# Patient Record
Sex: FEMALE | Race: WHITE | NOT HISPANIC OR LATINO | Employment: UNEMPLOYED | URBAN - METROPOLITAN AREA
[De-identification: names, ages, dates, MRNs, and addresses within clinical notes are randomized per-mention and may not be internally consistent; named-entity substitution may affect disease eponyms.]

---

## 2024-02-17 ENCOUNTER — APPOINTMENT (OUTPATIENT)
Dept: RADIOLOGY | Facility: HOSPITAL | Age: 39
End: 2024-02-17
Payer: COMMERCIAL

## 2024-02-17 ENCOUNTER — HOSPITAL ENCOUNTER (INPATIENT)
Facility: HOSPITAL | Age: 39
LOS: 2 days | Discharge: HOME | End: 2024-02-20
Attending: STUDENT IN AN ORGANIZED HEALTH CARE EDUCATION/TRAINING PROGRAM | Admitting: HOSPITALIST
Payer: COMMERCIAL

## 2024-02-17 ENCOUNTER — APPOINTMENT (OUTPATIENT)
Dept: CARDIOLOGY | Facility: HOSPITAL | Age: 39
End: 2024-02-17
Payer: COMMERCIAL

## 2024-02-17 DIAGNOSIS — J10.1 INFLUENZA B: Primary | ICD-10-CM

## 2024-02-17 DIAGNOSIS — R09.02 HYPOXIA: ICD-10-CM

## 2024-02-17 LAB
ALBUMIN SERPL BCP-MCNC: 3.9 G/DL (ref 3.4–5)
ALP SERPL-CCNC: 54 U/L (ref 33–110)
ALT SERPL W P-5'-P-CCNC: 25 U/L (ref 7–45)
ANION GAP SERPL CALC-SCNC: 15 MMOL/L (ref 10–20)
AST SERPL W P-5'-P-CCNC: 27 U/L (ref 9–39)
B-HCG SERPL-ACNC: <2 MIU/ML
BASOPHILS # BLD MANUAL: 0 X10*3/UL (ref 0–0.1)
BASOPHILS NFR BLD MANUAL: 0 %
BILIRUB SERPL-MCNC: 0.3 MG/DL (ref 0–1.2)
BUN SERPL-MCNC: 5 MG/DL (ref 6–23)
CALCIUM SERPL-MCNC: 8.4 MG/DL (ref 8.6–10.3)
CHLORIDE SERPL-SCNC: 97 MMOL/L (ref 98–107)
CO2 SERPL-SCNC: 26 MMOL/L (ref 21–32)
CREAT SERPL-MCNC: 0.57 MG/DL (ref 0.5–1.05)
EGFRCR SERPLBLD CKD-EPI 2021: >90 ML/MIN/1.73M*2
EOSINOPHIL # BLD MANUAL: 0.07 X10*3/UL (ref 0–0.7)
EOSINOPHIL NFR BLD MANUAL: 1 %
ERYTHROCYTE [DISTWIDTH] IN BLOOD BY AUTOMATED COUNT: 12.6 % (ref 11.5–14.5)
FLUAV RNA RESP QL NAA+PROBE: NOT DETECTED
FLUBV RNA RESP QL NAA+PROBE: DETECTED
GLUCOSE SERPL-MCNC: 96 MG/DL (ref 74–99)
HCT VFR BLD AUTO: 40 % (ref 36–46)
HGB BLD-MCNC: 13.6 G/DL (ref 12–16)
IMM GRANULOCYTES # BLD AUTO: 0.03 X10*3/UL (ref 0–0.7)
IMM GRANULOCYTES NFR BLD AUTO: 0.4 % (ref 0–0.9)
LYMPHOCYTES # BLD MANUAL: 1.05 X10*3/UL (ref 1.2–4.8)
LYMPHOCYTES NFR BLD MANUAL: 15 %
MAGNESIUM SERPL-MCNC: 2 MG/DL (ref 1.6–2.4)
MCH RBC QN AUTO: 30.4 PG (ref 26–34)
MCHC RBC AUTO-ENTMCNC: 34 G/DL (ref 32–36)
MCV RBC AUTO: 89 FL (ref 80–100)
METAMYELOCYTES # BLD MANUAL: 0.14 X10*3/UL
METAMYELOCYTES NFR BLD MANUAL: 2 %
MONOCYTES # BLD MANUAL: 0.28 X10*3/UL (ref 0.1–1)
MONOCYTES NFR BLD MANUAL: 4 %
NEUTROPHILS # BLD MANUAL: 5.18 X10*3/UL (ref 1.2–7.7)
NEUTS BAND # BLD MANUAL: 0.14 X10*3/UL (ref 0–0.7)
NEUTS BAND NFR BLD MANUAL: 2 %
NEUTS SEG # BLD MANUAL: 5.04 X10*3/UL (ref 1.2–7)
NEUTS SEG NFR BLD MANUAL: 72 %
NRBC BLD-RTO: 0 /100 WBCS (ref 0–0)
PHOSPHATE SERPL-MCNC: 3.2 MG/DL (ref 2.5–4.9)
PLATELET # BLD AUTO: 221 X10*3/UL (ref 150–450)
POTASSIUM SERPL-SCNC: 4 MMOL/L (ref 3.5–5.3)
PROT SERPL-MCNC: 7.5 G/DL (ref 6.4–8.2)
RBC # BLD AUTO: 4.48 X10*6/UL (ref 4–5.2)
RBC MORPH BLD: ABNORMAL
SARS-COV-2 RNA RESP QL NAA+PROBE: NOT DETECTED
SODIUM SERPL-SCNC: 134 MMOL/L (ref 136–145)
TOTAL CELLS COUNTED BLD: 100
VARIANT LYMPHS # BLD MANUAL: 0.28 X10*3/UL (ref 0–0.5)
VARIANT LYMPHS NFR BLD: 4 %
WBC # BLD AUTO: 7 X10*3/UL (ref 4.4–11.3)

## 2024-02-17 PROCEDURE — 71275 CT ANGIOGRAPHY CHEST: CPT | Mod: FOREIGN READ | Performed by: RADIOLOGY

## 2024-02-17 PROCEDURE — 87636 SARSCOV2 & INF A&B AMP PRB: CPT

## 2024-02-17 PROCEDURE — 36415 COLL VENOUS BLD VENIPUNCTURE: CPT | Performed by: STUDENT IN AN ORGANIZED HEALTH CARE EDUCATION/TRAINING PROGRAM

## 2024-02-17 PROCEDURE — 83735 ASSAY OF MAGNESIUM: CPT

## 2024-02-17 PROCEDURE — 94640 AIRWAY INHALATION TREATMENT: CPT

## 2024-02-17 PROCEDURE — 36415 COLL VENOUS BLD VENIPUNCTURE: CPT

## 2024-02-17 PROCEDURE — 80053 COMPREHEN METABOLIC PANEL: CPT

## 2024-02-17 PROCEDURE — 93005 ELECTROCARDIOGRAM TRACING: CPT

## 2024-02-17 PROCEDURE — 2500000004 HC RX 250 GENERAL PHARMACY W/ HCPCS (ALT 636 FOR OP/ED): Performed by: STUDENT IN AN ORGANIZED HEALTH CARE EDUCATION/TRAINING PROGRAM

## 2024-02-17 PROCEDURE — 85027 COMPLETE CBC AUTOMATED: CPT

## 2024-02-17 PROCEDURE — 2550000001 HC RX 255 CONTRASTS

## 2024-02-17 PROCEDURE — 71046 X-RAY EXAM CHEST 2 VIEWS: CPT

## 2024-02-17 PROCEDURE — 2500000002 HC RX 250 W HCPCS SELF ADMINISTERED DRUGS (ALT 637 FOR MEDICARE OP, ALT 636 FOR OP/ED)

## 2024-02-17 PROCEDURE — 96375 TX/PRO/DX INJ NEW DRUG ADDON: CPT

## 2024-02-17 PROCEDURE — 85060 BLOOD SMEAR INTERPRETATION: CPT

## 2024-02-17 PROCEDURE — 2500000004 HC RX 250 GENERAL PHARMACY W/ HCPCS (ALT 636 FOR OP/ED)

## 2024-02-17 PROCEDURE — 71046 X-RAY EXAM CHEST 2 VIEWS: CPT | Performed by: RADIOLOGY

## 2024-02-17 PROCEDURE — 84702 CHORIONIC GONADOTROPIN TEST: CPT | Performed by: STUDENT IN AN ORGANIZED HEALTH CARE EDUCATION/TRAINING PROGRAM

## 2024-02-17 PROCEDURE — 96367 TX/PROPH/DG ADDL SEQ IV INF: CPT

## 2024-02-17 PROCEDURE — 99285 EMERGENCY DEPT VISIT HI MDM: CPT | Mod: 25

## 2024-02-17 PROCEDURE — 71275 CT ANGIOGRAPHY CHEST: CPT

## 2024-02-17 PROCEDURE — 84100 ASSAY OF PHOSPHORUS: CPT

## 2024-02-17 PROCEDURE — 96365 THER/PROPH/DIAG IV INF INIT: CPT

## 2024-02-17 PROCEDURE — 85007 BL SMEAR W/DIFF WBC COUNT: CPT

## 2024-02-17 PROCEDURE — 87040 BLOOD CULTURE FOR BACTERIA: CPT | Mod: AHULAB | Performed by: STUDENT IN AN ORGANIZED HEALTH CARE EDUCATION/TRAINING PROGRAM

## 2024-02-17 RX ORDER — ONDANSETRON HYDROCHLORIDE 2 MG/ML
4 INJECTION, SOLUTION INTRAVENOUS ONCE
Status: COMPLETED | OUTPATIENT
Start: 2024-02-17 | End: 2024-02-17

## 2024-02-17 RX ORDER — IPRATROPIUM BROMIDE AND ALBUTEROL SULFATE 2.5; .5 MG/3ML; MG/3ML
3 SOLUTION RESPIRATORY (INHALATION) EVERY 20 MIN
Status: COMPLETED | OUTPATIENT
Start: 2024-02-17 | End: 2024-02-17

## 2024-02-17 RX ORDER — OSELTAMIVIR PHOSPHATE 75 MG/1
75 CAPSULE ORAL ONCE
Status: COMPLETED | OUTPATIENT
Start: 2024-02-17 | End: 2024-02-17

## 2024-02-17 RX ORDER — ACETAMINOPHEN 325 MG/1
975 TABLET ORAL ONCE
Status: COMPLETED | OUTPATIENT
Start: 2024-02-17 | End: 2024-02-17

## 2024-02-17 RX ADMIN — SODIUM CHLORIDE 1000 ML: 9 INJECTION, SOLUTION INTRAVENOUS at 20:14

## 2024-02-17 RX ADMIN — IPRATROPIUM BROMIDE AND ALBUTEROL SULFATE 3 ML: 2.5; .5 SOLUTION RESPIRATORY (INHALATION) at 17:23

## 2024-02-17 RX ADMIN — OSELTAMAVIR PHOSPHATE 75 MG: 75 CAPSULE ORAL at 20:16

## 2024-02-17 RX ADMIN — ONDANSETRON 4 MG: 2 INJECTION INTRAMUSCULAR; INTRAVENOUS at 22:03

## 2024-02-17 RX ADMIN — AZITHROMYCIN MONOHYDRATE 500 MG: 500 INJECTION, POWDER, LYOPHILIZED, FOR SOLUTION INTRAVENOUS at 20:50

## 2024-02-17 RX ADMIN — IPRATROPIUM BROMIDE AND ALBUTEROL SULFATE 3 ML: 2.5; .5 SOLUTION RESPIRATORY (INHALATION) at 17:25

## 2024-02-17 RX ADMIN — CEFTRIAXONE 2 G: 2 INJECTION, POWDER, FOR SOLUTION INTRAMUSCULAR; INTRAVENOUS at 20:14

## 2024-02-17 RX ADMIN — IOHEXOL 60 ML: 350 INJECTION, SOLUTION INTRAVENOUS at 21:47

## 2024-02-17 RX ADMIN — ACETAMINOPHEN 975 MG: 325 TABLET ORAL at 20:35

## 2024-02-17 RX ADMIN — IPRATROPIUM BROMIDE AND ALBUTEROL SULFATE 3 ML: 2.5; .5 SOLUTION RESPIRATORY (INHALATION) at 17:19

## 2024-02-17 ASSESSMENT — COLUMBIA-SUICIDE SEVERITY RATING SCALE - C-SSRS
2. HAVE YOU ACTUALLY HAD ANY THOUGHTS OF KILLING YOURSELF?: NO
6. HAVE YOU EVER DONE ANYTHING, STARTED TO DO ANYTHING, OR PREPARED TO DO ANYTHING TO END YOUR LIFE?: NO
1. IN THE PAST MONTH, HAVE YOU WISHED YOU WERE DEAD OR WISHED YOU COULD GO TO SLEEP AND NOT WAKE UP?: NO

## 2024-02-17 ASSESSMENT — PAIN SCALES - GENERAL
PAINLEVEL_OUTOF10: 0 - NO PAIN
PAINLEVEL_OUTOF10: 1
PAINLEVEL_OUTOF10: 0 - NO PAIN
PAINLEVEL_OUTOF10: 0 - NO PAIN

## 2024-02-17 ASSESSMENT — PAIN DESCRIPTION - DESCRIPTORS: DESCRIPTORS: HEADACHE

## 2024-02-17 ASSESSMENT — PAIN DESCRIPTION - LOCATION: LOCATION: HEAD

## 2024-02-17 ASSESSMENT — PAIN - FUNCTIONAL ASSESSMENT
PAIN_FUNCTIONAL_ASSESSMENT: 0-10

## 2024-02-17 ASSESSMENT — PAIN DESCRIPTION - PAIN TYPE: TYPE: ACUTE PAIN

## 2024-02-17 NOTE — ED TRIAGE NOTES
Pt presents to ED with c/o   was at urgent care today referred to ED for low oxygen saturation & pneumonia. Was traveling in [facility] in [place], returned  2/15    HX asthma

## 2024-02-17 NOTE — ED PROVIDER NOTES
HPI   Chief Complaint   Patient presents with    Shortness of Breath       Patient is a 38-year-old female who presents for evaluation of acute hypoxia cough and difficulty breathing.  She just got back from a hiking trip in [place] on Thursday and has been feeling progressively more short of breath over the last few days.  She endorses history of asthma and taking her home inhaler and home nebulizer treatments.  last nebulizer at 3 PM today.  She has been having congestion and a nonproductive cough with wheezing.  Nebulizers did help with her symptoms.  She has not had any fevers.  She went to urgent care for initial evaluation and with hypoxia she was sent to emergency department with concerns for pneumonia.  She is not having any leg swelling or pain.  No history of blood clots no history of OCP use no tobacco use.  She does have recent travel history with flight.                        [place] Coma Scale Score: 15                  Patient History   Past Medical History:   Diagnosis Date    Personal history of other diseases of the respiratory system     History of asthma     Past Surgical History:   Procedure Laterality Date    OTHER SURGICAL HISTORY  10/19/2021    Oral surgery     No family history on file.  Social History     Tobacco Use    Smoking status: Former     Types: Cigarettes    Smokeless tobacco: Never   Substance Use Topics    Alcohol use: Not on file    Drug use: Not on file       Physical Exam   ED Triage Vitals [02/17/24 1618]   Temperature Heart Rate Respirations BP   36.7 °C (98.1 °F) (!) 111 (!) 22 139/76      SpO2 Temp src Heart Rate Source Patient Position   -- -- -- --      BP Location FiO2 (%)     -- --       Physical Exam  Vitals and nursing note reviewed.   Constitutional:       General: She is not in acute distress.     Appearance: She is well-developed and normal weight. She is not diaphoretic.   HENT:      Head: Normocephalic and atraumatic.      Mouth/Throat:      Mouth: Mucous  membranes are moist.   Eyes:      Conjunctiva/sclera: Conjunctivae normal.   Cardiovascular:      Rate and Rhythm: Normal rate and regular rhythm.      Heart sounds: No murmur heard.  Pulmonary:      Effort: Pulmonary effort is normal. No respiratory distress.      Breath sounds: Normal breath sounds.   Abdominal:      Palpations: Abdomen is soft.      Tenderness: There is no abdominal tenderness.   Musculoskeletal:         General: No swelling.      Cervical back: Neck supple.   Skin:     General: Skin is warm and dry.      Capillary Refill: Capillary refill takes less than 2 seconds.   Neurological:      Mental Status: She is alert.   Psychiatric:         Mood and Affect: Mood normal.       ED Course & MDM   ED Course as of 02/20/24 0708   Sat Feb 17, 2024   1717 XR chest 2 views  Chest x-ray independently reviewed with no acute cardiopulmonary process concerning for consolidation/pneumonia. [SC]   2349 Bands %, Manual: 2.0 [MM]   Sun Feb 18, 2024   0008 Patient signed out to me at 1800.  Patient presenting with hypoxia, shortness of breath with new oxygen requirement of 3 L.  Found to have bilateral lower lobe pneumonia treated with ceftriaxone and azithromycin.  Patient is flu be positive, received Tamiflu.  Accepted for admission to medicine for IV antibiotics, telemetry monitoring observation. [EG]      ED Course User Index  [EG] Aracely Tracy MD  [MM] Malcolm Ceballos MD  [SC] Nelli Moore DO         Diagnoses as of 02/20/24 0708   Influenza B   Hypoxia     Labs Reviewed   HCG, URINE, QUALITATIVE   CBC WITH AUTO DIFFERENTIAL   MAGNESIUM   PHOSPHORUS   COMPREHENSIVE METABOLIC PANEL   INFLUENZA A AND B PCR   SARS-COV-2 PCR   BLOOD GAS VENOUS FULL PANEL   HUMAN CHORIONIC GONADOTROPIN, SERUM QUANTITATIVE       XR chest 2 views   Final Result   Left basilar airspace consolidation, as above. Clinical correlation   and continued follow-up until clearing is recommended.        MACRO:   None.        Signed by: Daen  Annemarie 2/17/2024 5:25 PM   Dictation workstation:   [id number]      Point of Care Ultrasound    (Results Pending)   CT angio chest for pulmonary embolism    (Results Pending)       Medical Decision Making  Patient is an otherwise healthy 38-year-old female who presents for evaluation after being found to be hypoxic at urgent care.  She just got back from a cross-country flight where she was taking [place].  She has had a cough congestion pleuritic chest pain.  My differential for this patient include viral illness pneumonia and PE with low suspicion for atypical ACS MSK or pleurisy.  On arrival patient is afebrile though hypoxic and placed on 3 L nasal cannula.  She is tachycardic and afebrile.  Blood pressures are stable.  Chest x-ray shows left-sided consolidation concerning for pneumonia and patient's viral swabs are positive for flu B.  She was started on Tamiflu and with her history of asthma was given DuoNebs.  basic labs were drawn for this patient with mild hyponatremia most likely in the setting of dehydration.  Patient was given fluids.  Otherwise labs within normal limits.  Given new hypoxia patient to receive CT PE with plans for admission after rule out of concomitant VTE.    Pt seen and discussed with Dr. Tucker Moore DO  PGY-2 Emergency Medicine          Procedure  Procedures       Nelli Moore DO  Resident  02/20/24 1174

## 2024-02-18 PROBLEM — J10.1 INFLUENZA B: Status: ACTIVE | Noted: 2024-02-18

## 2024-02-18 LAB
ANION GAP SERPL CALC-SCNC: 13 MMOL/L (ref 10–20)
BUN SERPL-MCNC: 3 MG/DL (ref 6–23)
CALCIUM SERPL-MCNC: 7.8 MG/DL (ref 8.6–10.3)
CHLORIDE SERPL-SCNC: 103 MMOL/L (ref 98–107)
CO2 SERPL-SCNC: 23 MMOL/L (ref 21–32)
CREAT SERPL-MCNC: 0.57 MG/DL (ref 0.5–1.05)
EGFRCR SERPLBLD CKD-EPI 2021: >90 ML/MIN/1.73M*2
ERYTHROCYTE [DISTWIDTH] IN BLOOD BY AUTOMATED COUNT: 12.6 % (ref 11.5–14.5)
GLUCOSE SERPL-MCNC: 102 MG/DL (ref 74–99)
HCT VFR BLD AUTO: 33.5 % (ref 36–46)
HGB BLD-MCNC: 11 G/DL (ref 12–16)
MCH RBC QN AUTO: 30.1 PG (ref 26–34)
MCHC RBC AUTO-ENTMCNC: 32.8 G/DL (ref 32–36)
MCV RBC AUTO: 92 FL (ref 80–100)
NRBC BLD-RTO: 0 /100 WBCS (ref 0–0)
PLATELET # BLD AUTO: 199 X10*3/UL (ref 150–450)
POTASSIUM SERPL-SCNC: 3.3 MMOL/L (ref 3.5–5.3)
RBC # BLD AUTO: 3.65 X10*6/UL (ref 4–5.2)
SODIUM SERPL-SCNC: 136 MMOL/L (ref 136–145)
WBC # BLD AUTO: 9.3 X10*3/UL (ref 4.4–11.3)

## 2024-02-18 PROCEDURE — 99222 1ST HOSP IP/OBS MODERATE 55: CPT | Performed by: HOSPITALIST

## 2024-02-18 PROCEDURE — 1100000001 HC PRIVATE ROOM DAILY

## 2024-02-18 PROCEDURE — 36415 COLL VENOUS BLD VENIPUNCTURE: CPT | Performed by: HOSPITALIST

## 2024-02-18 PROCEDURE — 2500000004 HC RX 250 GENERAL PHARMACY W/ HCPCS (ALT 636 FOR OP/ED): Performed by: HOSPITALIST

## 2024-02-18 PROCEDURE — 2500000002 HC RX 250 W HCPCS SELF ADMINISTERED DRUGS (ALT 637 FOR MEDICARE OP, ALT 636 FOR OP/ED): Performed by: PHARMACIST

## 2024-02-18 PROCEDURE — 2500000001 HC RX 250 WO HCPCS SELF ADMINISTERED DRUGS (ALT 637 FOR MEDICARE OP): Performed by: STUDENT IN AN ORGANIZED HEALTH CARE EDUCATION/TRAINING PROGRAM

## 2024-02-18 PROCEDURE — 87899 AGENT NOS ASSAY W/OPTIC: CPT | Mod: AHULAB | Performed by: STUDENT IN AN ORGANIZED HEALTH CARE EDUCATION/TRAINING PROGRAM

## 2024-02-18 PROCEDURE — 80048 BASIC METABOLIC PNL TOTAL CA: CPT | Performed by: HOSPITALIST

## 2024-02-18 PROCEDURE — 94640 AIRWAY INHALATION TREATMENT: CPT

## 2024-02-18 PROCEDURE — 85027 COMPLETE CBC AUTOMATED: CPT | Performed by: HOSPITALIST

## 2024-02-18 PROCEDURE — 2500000004 HC RX 250 GENERAL PHARMACY W/ HCPCS (ALT 636 FOR OP/ED): Performed by: STUDENT IN AN ORGANIZED HEALTH CARE EDUCATION/TRAINING PROGRAM

## 2024-02-18 PROCEDURE — 2500000005 HC RX 250 GENERAL PHARMACY W/O HCPCS: Performed by: HOSPITALIST

## 2024-02-18 PROCEDURE — 2500000002 HC RX 250 W HCPCS SELF ADMINISTERED DRUGS (ALT 637 FOR MEDICARE OP, ALT 636 FOR OP/ED): Performed by: HOSPITALIST

## 2024-02-18 PROCEDURE — 87449 NOS EACH ORGANISM AG IA: CPT | Mod: AHULAB | Performed by: STUDENT IN AN ORGANIZED HEALTH CARE EDUCATION/TRAINING PROGRAM

## 2024-02-18 PROCEDURE — 2500000001 HC RX 250 WO HCPCS SELF ADMINISTERED DRUGS (ALT 637 FOR MEDICARE OP): Performed by: HOSPITALIST

## 2024-02-18 RX ORDER — ACETAMINOPHEN 325 MG/1
650 TABLET ORAL EVERY 4 HOURS PRN
Status: DISCONTINUED | OUTPATIENT
Start: 2024-02-18 | End: 2024-02-20 | Stop reason: HOSPADM

## 2024-02-18 RX ORDER — FLUTICASONE FUROATE AND VILANTEROL 200; 25 UG/1; UG/1
1 POWDER RESPIRATORY (INHALATION)
Status: DISCONTINUED | OUTPATIENT
Start: 2024-02-18 | End: 2024-02-18

## 2024-02-18 RX ORDER — TALC
3 POWDER (GRAM) TOPICAL DAILY
Status: DISCONTINUED | OUTPATIENT
Start: 2024-02-18 | End: 2024-02-20 | Stop reason: HOSPADM

## 2024-02-18 RX ORDER — CEFTRIAXONE 1 G/50ML
1 INJECTION, SOLUTION INTRAVENOUS EVERY 24 HOURS
Status: DISCONTINUED | OUTPATIENT
Start: 2024-02-18 | End: 2024-02-18

## 2024-02-18 RX ORDER — IPRATROPIUM BROMIDE AND ALBUTEROL SULFATE 2.5; .5 MG/3ML; MG/3ML
3 SOLUTION RESPIRATORY (INHALATION) EVERY 2 HOUR PRN
Status: DISCONTINUED | OUTPATIENT
Start: 2024-02-18 | End: 2024-02-20 | Stop reason: HOSPADM

## 2024-02-18 RX ORDER — SODIUM CHLORIDE 9 MG/ML
100 INJECTION, SOLUTION INTRAVENOUS CONTINUOUS
Status: DISCONTINUED | OUTPATIENT
Start: 2024-02-18 | End: 2024-02-19

## 2024-02-18 RX ORDER — GUAIFENESIN 600 MG/1
600 TABLET, EXTENDED RELEASE ORAL 2 TIMES DAILY PRN
Status: DISCONTINUED | OUTPATIENT
Start: 2024-02-18 | End: 2024-02-20 | Stop reason: HOSPADM

## 2024-02-18 RX ORDER — IPRATROPIUM BROMIDE AND ALBUTEROL SULFATE 2.5; .5 MG/3ML; MG/3ML
3 SOLUTION RESPIRATORY (INHALATION)
Status: DISCONTINUED | OUTPATIENT
Start: 2024-02-18 | End: 2024-02-20 | Stop reason: HOSPADM

## 2024-02-18 RX ORDER — OSELTAMIVIR PHOSPHATE 75 MG/1
75 CAPSULE ORAL 2 TIMES DAILY
Status: DISCONTINUED | OUTPATIENT
Start: 2024-02-18 | End: 2024-02-20 | Stop reason: HOSPADM

## 2024-02-18 RX ORDER — FORMOTEROL FUMARATE DIHYDRATE 20 UG/2ML
20 SOLUTION RESPIRATORY (INHALATION)
Status: DISCONTINUED | OUTPATIENT
Start: 2024-02-18 | End: 2024-02-20 | Stop reason: HOSPADM

## 2024-02-18 RX ORDER — BUDESONIDE 0.5 MG/2ML
0.5 INHALANT ORAL
Status: DISCONTINUED | OUTPATIENT
Start: 2024-02-18 | End: 2024-02-20 | Stop reason: HOSPADM

## 2024-02-18 RX ORDER — IPRATROPIUM BROMIDE AND ALBUTEROL SULFATE 2.5; .5 MG/3ML; MG/3ML
3 SOLUTION RESPIRATORY (INHALATION)
Status: DISCONTINUED | OUTPATIENT
Start: 2024-02-18 | End: 2024-02-18

## 2024-02-18 RX ORDER — GUAIFENESIN 100 MG/5ML
200 SOLUTION ORAL EVERY 4 HOURS PRN
Status: DISCONTINUED | OUTPATIENT
Start: 2024-02-18 | End: 2024-02-20 | Stop reason: HOSPADM

## 2024-02-18 RX ORDER — ONDANSETRON HYDROCHLORIDE 2 MG/ML
4 INJECTION, SOLUTION INTRAVENOUS EVERY 8 HOURS PRN
Status: DISCONTINUED | OUTPATIENT
Start: 2024-02-18 | End: 2024-02-20 | Stop reason: HOSPADM

## 2024-02-18 RX ORDER — BENZONATATE 100 MG/1
100 CAPSULE ORAL 3 TIMES DAILY PRN
Status: DISCONTINUED | OUTPATIENT
Start: 2024-02-18 | End: 2024-02-20 | Stop reason: HOSPADM

## 2024-02-18 RX ORDER — POTASSIUM CHLORIDE 1.5 G/1.58G
40 POWDER, FOR SOLUTION ORAL ONCE
Status: COMPLETED | OUTPATIENT
Start: 2024-02-18 | End: 2024-02-18

## 2024-02-18 RX ORDER — ONDANSETRON 4 MG/1
4 TABLET, ORALLY DISINTEGRATING ORAL EVERY 8 HOURS PRN
Status: DISCONTINUED | OUTPATIENT
Start: 2024-02-18 | End: 2024-02-20 | Stop reason: HOSPADM

## 2024-02-18 RX ADMIN — IPRATROPIUM BROMIDE AND ALBUTEROL SULFATE 3 ML: 2.5; .5 SOLUTION RESPIRATORY (INHALATION) at 14:24

## 2024-02-18 RX ADMIN — IPRATROPIUM BROMIDE AND ALBUTEROL SULFATE 3 ML: 2.5; .5 SOLUTION RESPIRATORY (INHALATION) at 02:21

## 2024-02-18 RX ADMIN — SODIUM CHLORIDE 100 ML/HR: 9 INJECTION, SOLUTION INTRAVENOUS at 02:16

## 2024-02-18 RX ADMIN — BUDESONIDE 0.5 MG: 0.5 INHALANT RESPIRATORY (INHALATION) at 19:58

## 2024-02-18 RX ADMIN — BUDESONIDE 0.5 MG: 0.5 INHALANT RESPIRATORY (INHALATION) at 08:24

## 2024-02-18 RX ADMIN — Medication 2 L/MIN: at 03:20

## 2024-02-18 RX ADMIN — FORMOTEROL FUMARATE DIHYDRATE 20 MCG: 20 SOLUTION RESPIRATORY (INHALATION) at 19:58

## 2024-02-18 RX ADMIN — POTASSIUM CHLORIDE 40 MEQ: 1.5 POWDER, FOR SOLUTION ORAL at 09:57

## 2024-02-18 RX ADMIN — FORMOTEROL FUMARATE DIHYDRATE 20 MCG: 20 SOLUTION RESPIRATORY (INHALATION) at 08:23

## 2024-02-18 RX ADMIN — AZITHROMYCIN MONOHYDRATE 500 MG: 500 INJECTION, POWDER, LYOPHILIZED, FOR SOLUTION INTRAVENOUS at 21:54

## 2024-02-18 RX ADMIN — IPRATROPIUM BROMIDE AND ALBUTEROL SULFATE 3 ML: 2.5; .5 SOLUTION RESPIRATORY (INHALATION) at 08:23

## 2024-02-18 RX ADMIN — Medication 3 MG: at 21:54

## 2024-02-18 RX ADMIN — OSELTAMAVIR PHOSPHATE 75 MG: 75 CAPSULE ORAL at 21:54

## 2024-02-18 RX ADMIN — CEFTRIAXONE 2 G: 2 INJECTION, POWDER, FOR SOLUTION INTRAMUSCULAR; INTRAVENOUS at 21:53

## 2024-02-18 RX ADMIN — OSELTAMAVIR PHOSPHATE 75 MG: 75 CAPSULE ORAL at 08:15

## 2024-02-18 RX ADMIN — SODIUM CHLORIDE 100 ML/HR: 9 INJECTION, SOLUTION INTRAVENOUS at 15:20

## 2024-02-18 RX ADMIN — IPRATROPIUM BROMIDE AND ALBUTEROL SULFATE 3 ML: 2.5; .5 SOLUTION RESPIRATORY (INHALATION) at 19:58

## 2024-02-18 SDOH — SOCIAL STABILITY: SOCIAL INSECURITY: WERE YOU ABLE TO COMPLETE ALL THE BEHAVIORAL HEALTH SCREENINGS?: YES

## 2024-02-18 SDOH — SOCIAL STABILITY: SOCIAL INSECURITY: ARE YOU OR HAVE YOU BEEN THREATENED OR ABUSED PHYSICALLY, EMOTIONALLY, OR SEXUALLY BY ANYONE?: NO

## 2024-02-18 SDOH — SOCIAL STABILITY: SOCIAL INSECURITY: ARE THERE ANY APPARENT SIGNS OF INJURIES/BEHAVIORS THAT COULD BE RELATED TO ABUSE/NEGLECT?: NO

## 2024-02-18 SDOH — SOCIAL STABILITY: SOCIAL INSECURITY: HAS ANYONE EVER THREATENED TO HURT YOUR FAMILY OR YOUR PETS?: NO

## 2024-02-18 SDOH — SOCIAL STABILITY: SOCIAL INSECURITY: DO YOU FEEL ANYONE HAS EXPLOITED OR TAKEN ADVANTAGE OF YOU FINANCIALLY OR OF YOUR PERSONAL PROPERTY?: NO

## 2024-02-18 SDOH — SOCIAL STABILITY: SOCIAL INSECURITY: ABUSE: ADULT

## 2024-02-18 SDOH — SOCIAL STABILITY: SOCIAL INSECURITY: DO YOU FEEL UNSAFE GOING BACK TO THE PLACE WHERE YOU ARE LIVING?: NO

## 2024-02-18 SDOH — SOCIAL STABILITY: SOCIAL INSECURITY: DOES ANYONE TRY TO KEEP YOU FROM HAVING/CONTACTING OTHER FRIENDS OR DOING THINGS OUTSIDE YOUR HOME?: NO

## 2024-02-18 SDOH — SOCIAL STABILITY: SOCIAL INSECURITY: HAVE YOU HAD THOUGHTS OF HARMING ANYONE ELSE?: NO

## 2024-02-18 ASSESSMENT — COGNITIVE AND FUNCTIONAL STATUS - GENERAL
MOBILITY SCORE: 24
DAILY ACTIVITIY SCORE: 24
DAILY ACTIVITIY SCORE: 24
MOBILITY SCORE: 24
PATIENT BASELINE BEDBOUND: NO

## 2024-02-18 ASSESSMENT — ACTIVITIES OF DAILY LIVING (ADL)
WALKS IN HOME: INDEPENDENT
GROOMING: INDEPENDENT
PATIENT'S MEMORY ADEQUATE TO SAFELY COMPLETE DAILY ACTIVITIES?: NO
ADEQUATE_TO_COMPLETE_ADL: NO
FEEDING YOURSELF: INDEPENDENT
LACK_OF_TRANSPORTATION: NO
JUDGMENT_ADEQUATE_SAFELY_COMPLETE_DAILY_ACTIVITIES: NO
BATHING: INDEPENDENT
HEARING - RIGHT EAR: FUNCTIONAL
HEARING - LEFT EAR: FUNCTIONAL
DRESSING YOURSELF: INDEPENDENT
TOILETING: INDEPENDENT

## 2024-02-18 ASSESSMENT — PAIN - FUNCTIONAL ASSESSMENT
PAIN_FUNCTIONAL_ASSESSMENT: 0-10

## 2024-02-18 ASSESSMENT — ENCOUNTER SYMPTOMS
SHORTNESS OF BREATH: 1
APPETITE CHANGE: 1
COUGH: 1
NAUSEA: 1
FEVER: 1
PSYCHIATRIC NEGATIVE: 1
DIARRHEA: 1
ALLERGIC/IMMUNOLOGIC NEGATIVE: 1
FATIGUE: 1
NEUROLOGICAL NEGATIVE: 1
MUSCULOSKELETAL NEGATIVE: 1
VOMITING: 1
HEMATOLOGIC/LYMPHATIC NEGATIVE: 1
EYES NEGATIVE: 1
CHILLS: 1
CARDIOVASCULAR NEGATIVE: 1

## 2024-02-18 ASSESSMENT — PATIENT HEALTH QUESTIONNAIRE - PHQ9
1. LITTLE INTEREST OR PLEASURE IN DOING THINGS: NOT AT ALL
SUM OF ALL RESPONSES TO PHQ9 QUESTIONS 1 & 2: 0
2. FEELING DOWN, DEPRESSED OR HOPELESS: NOT AT ALL

## 2024-02-18 ASSESSMENT — LIFESTYLE VARIABLES
HOW OFTEN DO YOU HAVE 6 OR MORE DRINKS ON ONE OCCASION: NEVER
PRESCIPTION_ABUSE_PAST_12_MONTHS: NO
AUDIT-C TOTAL SCORE: 0
HOW OFTEN DO YOU HAVE A DRINK CONTAINING ALCOHOL: NEVER
HOW MANY STANDARD DRINKS CONTAINING ALCOHOL DO YOU HAVE ON A TYPICAL DAY: PATIENT DOES NOT DRINK
SUBSTANCE_ABUSE_PAST_12_MONTHS: NO
AUDIT-C TOTAL SCORE: 0
SKIP TO QUESTIONS 9-10: 1

## 2024-02-18 ASSESSMENT — PAIN SCALES - GENERAL
PAINLEVEL_OUTOF10: 5 - MODERATE PAIN
PAINLEVEL_OUTOF10: 0 - NO PAIN

## 2024-02-18 NOTE — H&P
History Of Present Illness  Nai Sultana is a 38 y.o. female with a PMH of asthma, bipolar disorder, presenting with cough, SOB for the past few days. She recently traveled to [place], thinks she caught something on the plane, since she was sick during her vacation.   Has had a dry cough, SOB, fatigue.   +N/V in the ED  Diarrhea over the past week.   Dec appetite.   +fever, shaking chills.   Used her nebulizer without relief.     In the ED she was found to be influenza positive.   CT chest showed no PE; bilateral lower lobe pneumonia.        Past Medical History  Past Medical History:   Diagnosis Date    Personal history of other diseases of the respiratory system     History of asthma       Surgical History  Past Surgical History:   Procedure Laterality Date    OTHER SURGICAL HISTORY  10/19/2021    Oral surgery        Social History  She reports that she has quit smoking. Her smoking use included cigarettes. She has never used smokeless tobacco. No history on file for alcohol use and drug use.    Family History  No family history on file.     Allergies  House dust    Review of Systems   Constitutional:  Positive for appetite change, chills, fatigue and fever.        Middle aged female, frequent dry cough, mild distress.      HENT: Negative.     Eyes: Negative.    Respiratory:  Positive for cough and shortness of breath.    Cardiovascular: Negative.    Gastrointestinal:  Positive for diarrhea, nausea and vomiting.   Genitourinary: Negative.    Musculoskeletal: Negative.    Skin: Negative.    Allergic/Immunologic: Negative.    Neurological: Negative.    Hematological: Negative.    Psychiatric/Behavioral: Negative.          Physical Exam  Vitals reviewed.   Constitutional:       Appearance: Normal appearance.   HENT:      Head: Normocephalic and atraumatic.      Mouth/Throat:      Mouth: Mucous membranes are moist.   Eyes:      Extraocular Movements: Extraocular movements intact.      Conjunctiva/sclera:  "Conjunctivae normal.      Pupils: Pupils are equal, round, and reactive to light.   Cardiovascular:      Rate and Rhythm: Normal rate and regular rhythm.      Pulses: Normal pulses.      Heart sounds: Normal heart sounds.   Pulmonary:      Effort: Pulmonary effort is normal.      Breath sounds: Normal breath sounds.      Comments: Lungs sound clear  Frequent cough      Abdominal:      General: Abdomen is flat. Bowel sounds are normal.      Palpations: Abdomen is soft.   Musculoskeletal:         General: Normal range of motion.      Right lower leg: No edema.      Left lower leg: No edema.   Skin:     General: Skin is warm and dry.   Neurological:      General: No focal deficit present.      Mental Status: She is alert and oriented to person, place, and time.   Psychiatric:         Mood and Affect: Mood normal.         Behavior: Behavior normal.         Thought Content: Thought content normal.         Judgment: Judgment normal.          Last Recorded Vitals  Blood pressure 101/68, pulse 87, temperature 37.4 °C (99.3 °F), temperature source Temporal, resp. rate 16, height 1.6 m (5' 3\"), weight 65.8 kg (145 lb), SpO2 (!) 93 %.    Relevant Results        Results for orders placed or performed during the hospital encounter of 02/17/24 (from the past 24 hour(s))   CBC and Auto Differential   Result Value Ref Range    WBC 7.0 4.4 - 11.3 x10*3/uL    nRBC 0.0 0.0 - 0.0 /100 WBCs    RBC 4.48 4.00 - 5.20 x10*6/uL    Hemoglobin 13.6 12.0 - 16.0 g/dL    Hematocrit 40.0 36.0 - 46.0 %    MCV 89 80 - 100 fL    MCH 30.4 26.0 - 34.0 pg    MCHC 34.0 32.0 - 36.0 g/dL    RDW 12.6 11.5 - 14.5 %    Platelets 221 150 - 450 x10*3/uL    Immature Granulocytes %, Automated 0.4 0.0 - 0.9 %    Immature Granulocytes Absolute, Automated 0.03 0.00 - 0.70 x10*3/uL   Magnesium   Result Value Ref Range    Magnesium 2.00 1.60 - 2.40 mg/dL   Phosphorus   Result Value Ref Range    Phosphorus 3.2 2.5 - 4.9 mg/dL   Comprehensive metabolic panel   Result " Value Ref Range    Glucose 96 74 - 99 mg/dL    Sodium 134 (L) 136 - 145 mmol/L    Potassium 4.0 3.5 - 5.3 mmol/L    Chloride 97 (L) 98 - 107 mmol/L    Bicarbonate 26 21 - 32 mmol/L    Anion Gap 15 10 - 20 mmol/L    Urea Nitrogen 5 (L) 6 - 23 mg/dL    Creatinine 0.57 0.50 - 1.05 mg/dL    eGFR >90 >60 mL/min/1.73m*2    Calcium 8.4 (L) 8.6 - 10.3 mg/dL    Albumin 3.9 3.4 - 5.0 g/dL    Alkaline Phosphatase 54 33 - 110 U/L    Total Protein 7.5 6.4 - 8.2 g/dL    AST 27 9 - 39 U/L    Bilirubin, Total 0.3 0.0 - 1.2 mg/dL    ALT 25 7 - 45 U/L   Manual Differential   Result Value Ref Range    Neutrophils %, Manual 72.0 40.0 - 80.0 %    Bands %, Manual 2.0 0.0 - 5.0 %    Lymphocytes %, Manual 15.0 13.0 - 44.0 %    Monocytes %, Manual 4.0 2.0 - 10.0 %    Eosinophils %, Manual 1.0 0.0 - 6.0 %    Basophils %, Manual 0.0 0.0 - 2.0 %    Atypical Lymphocytes %, Manual 4.0 0.0 - 2.0 %    Metamyelocytes %, Manual 2.0 0.0 - 0.0 %    Seg Neutrophils Absolute, Manual 5.04 1.20 - 7.00 x10*3/uL    Bands Absolute, Manual 0.14 0.00 - 0.70 x10*3/uL    Lymphocytes Absolute, Manual 1.05 (L) 1.20 - 4.80 x10*3/uL    Monocytes Absolute, Manual 0.28 0.10 - 1.00 x10*3/uL    Eosinophils Absolute, Manual 0.07 0.00 - 0.70 x10*3/uL    Basophils Absolute, Manual 0.00 0.00 - 0.10 x10*3/uL    Atypical Lymphs Absolute, Manual 0.28 0.00 - 0.50 x10*3/uL    Metamyelocytes Absolute, Manual 0.14 0.00 - 0.00 x10*3/uL    Total Cells Counted 100     Neutrophils Absolute, Manual 5.18 1.20 - 7.70 x10*3/uL    RBC Morphology No significant RBC morphology present    Human Chorionic Gonadotropin, Serum Quantitative   Result Value Ref Range    HCG, Beta-Quantitative <2 <5 mIU/mL   Influenza A, and B PCR   Result Value Ref Range    Flu A Result Not Detected Not Detected    Flu B Result Detected (A) Not Detected   Sars-CoV-2 PCR   Result Value Ref Range    Coronavirus 2019, PCR Not Detected Not Detected     CT angio chest for pulmonary embolism    Result Date:  2/17/2024  STUDY: CT Angiogram of the Chest;  02/17/2024 9:59 PM INDICATION: Tachycardia, tachypnea, hypoxia on oxygen 3L. COMPARISON: XR chest 02/17/2024.  ACCESSION NUMBER(S): [id number] ORDERING CLINICIAN: WAQAS TORRES TECHNIQUE:  CTA of the chest was performed with intravenous contrast. Images are reviewed and processed at a workstation according to the CT angiogram protocol with 3-D and/or MIP post processing imaging generated.  Omnipaque 350:60 mL was administered intravenously. Automated mA/kV exposure control was utilized and patient examination was performed in strict accordance with principles of ALARA. FINDINGS: Pulmonary arteries are adequately opacified without acute or chronic filling defects.  The thoracic aorta is normal in course and caliber without dissection or aneurysm. The heart is normal in size without pericardial effusion.  Thoracic lymph nodes are not enlarged. There is no pleural effusion, pleural thickening, or pneumothorax. The airways are patent. Lung bases demonstrate patchy airspace opacities and basilar atelectasis.  Findings greater in the right lower lobe. Upper abdomen demonstrates no acute pathology.  Slight fatty liver morphology. There are no acute fractures.  No suspicious bony lesions.  There are mild degenerative changes in the thoracic spine with multilevel Schmorl's node deformities.  There is mild superior endplate concavity at T4 and T5.  No acute fractures demonstrated.    No pulmonary artery emboli. Bilateral lower lobe predominant pneumonia and atelectasis. Signed by Casey Dotson DO    XR chest 2 views    Result Date: 2/17/2024  Interpreted By:  Dean Sharpe, STUDY: XR CHEST 2 VIEWS  2/17/2024 5:06 pm   INDICATION: Signs/Symptoms:cf pna, sob, hypoxic   COMPARISON: None.   ACCESSION NUMBER(S): [id number]   ORDERING CLINICIAN: WAQAS TORRES   TECHNIQUE: PA and lateral views of the chest were obtained.   FINDINGS: Hazy opacity is seen at the left lung base and  may represent scarring, atelectasis and/or pneumonia. No pleural effusion or pneumothorax is identified. The cardiac silhouette is within normal limits for size.       Left basilar airspace consolidation, as above. Clinical correlation and continued follow-up until clearing is recommended.   MACRO: None.   Signed by: Dean Sharpe 2/17/2024 5:25 PM Dictation workstation:   [id number]        Assessment/Plan   Principal Problem:    Influenza B  - Tamiflu  - contact precautions  - Tylenol for fever    Bilateral pneumonia  - Ceftriazone and Azithromycin  - Mucinex  - Robitussin  - oxygen as needed    GI symptoms  - likely from infulenza  - Zofran for nausea       I spent 35 minutes in the professional and overall care of this patient.      Fara Zaldivar MD

## 2024-02-18 NOTE — CARE PLAN
The patient's goals for the shift include Patient will maintain adequate oxygenation this shift    The clinical goals for the shift include Patient will maintain adequate oxygenation this shift      Problem: Daily Care  Goal: Daily care needs are met  2/18/2024 1817 by Osiel Hummel RN  Outcome: Progressing     Problem: Psychosocial Needs  Goal: Demonstrates ability to cope with hospitalization/illness  2/18/2024 1817 by Osiel Hummel RN  Outcome: Progressing     Problem: Respiratory  Goal: No signs of respiratory distress (eg. Use of accessory muscles. Peds grunting)  Outcome: Progressing

## 2024-02-18 NOTE — CARE PLAN
The patient's goals for the shift include      The clinical goals for the shift include      Problem: Pain  Goal: My pain/discomfort is manageable  Outcome: Progressing     Problem: Safety  Goal: Patient will be injury free during hospitalization  Outcome: Progressing  Goal: I will remain free of falls  Outcome: Progressing     Problem: Daily Care  Goal: Daily care needs are met  Outcome: Progressing     Problem: Psychosocial Needs  Goal: Demonstrates ability to cope with hospitalization/illness  Outcome: Progressing  Goal: Collaborate with me, my family, and caregiver to identify my specific goals  Outcome: Progressing  Flowsheets (Taken 2/18/2024 7658)  Cultural Requests During Hospitalization: none  Spiritual Requests During Hospitalization: none     Problem: Discharge Barriers  Goal: My discharge needs are met  Outcome: Progressing     Problem: Skin  Goal: Prevent/manage excess moisture  Outcome: Progressing  Goal: Prevent/minimize sheer/friction injuries  Outcome: Progressing  Goal: Promote/optimize nutrition  Outcome: Progressing

## 2024-02-19 ENCOUNTER — APPOINTMENT (OUTPATIENT)
Dept: RADIOLOGY | Facility: HOSPITAL | Age: 39
End: 2024-02-19
Payer: COMMERCIAL

## 2024-02-19 ENCOUNTER — APPOINTMENT (OUTPATIENT)
Dept: CARDIOLOGY | Facility: HOSPITAL | Age: 39
End: 2024-02-19
Payer: COMMERCIAL

## 2024-02-19 LAB
ALBUMIN SERPL BCP-MCNC: 3 G/DL (ref 3.4–5)
ALP SERPL-CCNC: 52 U/L (ref 33–110)
ALT SERPL W P-5'-P-CCNC: 18 U/L (ref 7–45)
ANION GAP SERPL CALC-SCNC: 11 MMOL/L (ref 10–20)
AST SERPL W P-5'-P-CCNC: 14 U/L (ref 9–39)
BILIRUB SERPL-MCNC: 0.2 MG/DL (ref 0–1.2)
BUN SERPL-MCNC: 3 MG/DL (ref 6–23)
CALCIUM SERPL-MCNC: 7.6 MG/DL (ref 8.6–10.3)
CHLORIDE SERPL-SCNC: 100 MMOL/L (ref 98–107)
CO2 SERPL-SCNC: 22 MMOL/L (ref 21–32)
CREAT SERPL-MCNC: 0.52 MG/DL (ref 0.5–1.05)
EGFRCR SERPLBLD CKD-EPI 2021: >90 ML/MIN/1.73M*2
ERYTHROCYTE [DISTWIDTH] IN BLOOD BY AUTOMATED COUNT: 12.8 % (ref 11.5–14.5)
GLUCOSE SERPL-MCNC: 163 MG/DL (ref 74–99)
HCT VFR BLD AUTO: 33.7 % (ref 36–46)
HGB BLD-MCNC: 11.2 G/DL (ref 12–16)
LACTATE SERPL-SCNC: 0.6 MMOL/L (ref 0.4–2)
LEGIONELLA AG UR QL: NEGATIVE
MCH RBC QN AUTO: 29.2 PG (ref 26–34)
MCHC RBC AUTO-ENTMCNC: 33.2 G/DL (ref 32–36)
MCV RBC AUTO: 88 FL (ref 80–100)
NRBC BLD-RTO: 0 /100 WBCS (ref 0–0)
PATH REVIEW-CBC DIFFERENTIAL: NORMAL
PLATELET # BLD AUTO: 276 X10*3/UL (ref 150–450)
POTASSIUM SERPL-SCNC: 3.3 MMOL/L (ref 3.5–5.3)
PROT SERPL-MCNC: 6.4 G/DL (ref 6.4–8.2)
RBC # BLD AUTO: 3.84 X10*6/UL (ref 4–5.2)
S PNEUM AG UR QL: NEGATIVE
SODIUM SERPL-SCNC: 130 MMOL/L (ref 136–145)
WBC # BLD AUTO: 8.4 X10*3/UL (ref 4.4–11.3)

## 2024-02-19 PROCEDURE — 99232 SBSQ HOSP IP/OBS MODERATE 35: CPT | Performed by: STUDENT IN AN ORGANIZED HEALTH CARE EDUCATION/TRAINING PROGRAM

## 2024-02-19 PROCEDURE — 71045 X-RAY EXAM CHEST 1 VIEW: CPT | Performed by: RADIOLOGY

## 2024-02-19 PROCEDURE — 1100000001 HC PRIVATE ROOM DAILY

## 2024-02-19 PROCEDURE — 83605 ASSAY OF LACTIC ACID: CPT | Performed by: STUDENT IN AN ORGANIZED HEALTH CARE EDUCATION/TRAINING PROGRAM

## 2024-02-19 PROCEDURE — 36415 COLL VENOUS BLD VENIPUNCTURE: CPT | Performed by: STUDENT IN AN ORGANIZED HEALTH CARE EDUCATION/TRAINING PROGRAM

## 2024-02-19 PROCEDURE — 2500000002 HC RX 250 W HCPCS SELF ADMINISTERED DRUGS (ALT 637 FOR MEDICARE OP, ALT 636 FOR OP/ED): Performed by: INTERNAL MEDICINE

## 2024-02-19 PROCEDURE — 2500000004 HC RX 250 GENERAL PHARMACY W/ HCPCS (ALT 636 FOR OP/ED): Performed by: STUDENT IN AN ORGANIZED HEALTH CARE EDUCATION/TRAINING PROGRAM

## 2024-02-19 PROCEDURE — 2500000002 HC RX 250 W HCPCS SELF ADMINISTERED DRUGS (ALT 637 FOR MEDICARE OP, ALT 636 FOR OP/ED): Performed by: PHARMACIST

## 2024-02-19 PROCEDURE — 80053 COMPREHEN METABOLIC PANEL: CPT | Performed by: STUDENT IN AN ORGANIZED HEALTH CARE EDUCATION/TRAINING PROGRAM

## 2024-02-19 PROCEDURE — 71045 X-RAY EXAM CHEST 1 VIEW: CPT

## 2024-02-19 PROCEDURE — 2500000001 HC RX 250 WO HCPCS SELF ADMINISTERED DRUGS (ALT 637 FOR MEDICARE OP): Performed by: HOSPITALIST

## 2024-02-19 PROCEDURE — 2500000002 HC RX 250 W HCPCS SELF ADMINISTERED DRUGS (ALT 637 FOR MEDICARE OP, ALT 636 FOR OP/ED): Performed by: HOSPITALIST

## 2024-02-19 PROCEDURE — 94640 AIRWAY INHALATION TREATMENT: CPT

## 2024-02-19 PROCEDURE — 2500000001 HC RX 250 WO HCPCS SELF ADMINISTERED DRUGS (ALT 637 FOR MEDICARE OP): Performed by: INTERNAL MEDICINE

## 2024-02-19 PROCEDURE — 93005 ELECTROCARDIOGRAM TRACING: CPT

## 2024-02-19 PROCEDURE — 2500000004 HC RX 250 GENERAL PHARMACY W/ HCPCS (ALT 636 FOR OP/ED): Performed by: HOSPITALIST

## 2024-02-19 PROCEDURE — 85027 COMPLETE CBC AUTOMATED: CPT | Performed by: STUDENT IN AN ORGANIZED HEALTH CARE EDUCATION/TRAINING PROGRAM

## 2024-02-19 RX ORDER — FLUTICASONE PROPIONATE 50 MCG
2 SPRAY, SUSPENSION (ML) NASAL DAILY PRN
Status: DISCONTINUED | OUTPATIENT
Start: 2024-02-19 | End: 2024-02-20 | Stop reason: HOSPADM

## 2024-02-19 RX ORDER — POTASSIUM CHLORIDE 20 MEQ/1
40 TABLET, EXTENDED RELEASE ORAL ONCE
Status: COMPLETED | OUTPATIENT
Start: 2024-02-19 | End: 2024-02-19

## 2024-02-19 RX ORDER — BUDESONIDE AND FORMOTEROL FUMARATE DIHYDRATE 160; 4.5 UG/1; UG/1
1 AEROSOL RESPIRATORY (INHALATION) 2 TIMES DAILY
COMMUNITY
Start: 2023-08-18

## 2024-02-19 RX ORDER — ALBUTEROL SULFATE 90 UG/1
2 AEROSOL, METERED RESPIRATORY (INHALATION) EVERY 4 HOURS PRN
COMMUNITY
Start: 2023-08-18

## 2024-02-19 RX ORDER — HYDROCORTISONE 25 MG/G
CREAM TOPICAL 2 TIMES DAILY
Status: DISCONTINUED | OUTPATIENT
Start: 2024-02-19 | End: 2024-02-20 | Stop reason: HOSPADM

## 2024-02-19 RX ORDER — MONTELUKAST SODIUM 10 MG/1
1 TABLET ORAL NIGHTLY
COMMUNITY
Start: 2024-02-05

## 2024-02-19 RX ORDER — SODIUM CHLORIDE 9 MG/ML
100 INJECTION, SOLUTION INTRAVENOUS CONTINUOUS
Status: ACTIVE | OUTPATIENT
Start: 2024-02-19 | End: 2024-02-20

## 2024-02-19 RX ORDER — FLUTICASONE FUROATE AND VILANTEROL 200; 25 UG/1; UG/1
1 POWDER RESPIRATORY (INHALATION)
Status: DISCONTINUED | OUTPATIENT
Start: 2024-02-19 | End: 2024-02-19 | Stop reason: SDUPTHER

## 2024-02-19 RX ADMIN — OSELTAMAVIR PHOSPHATE 75 MG: 75 CAPSULE ORAL at 21:06

## 2024-02-19 RX ADMIN — IPRATROPIUM BROMIDE AND ALBUTEROL SULFATE 3 ML: 2.5; .5 SOLUTION RESPIRATORY (INHALATION) at 20:35

## 2024-02-19 RX ADMIN — SODIUM CHLORIDE 100 ML/HR: 9 INJECTION, SOLUTION INTRAVENOUS at 02:15

## 2024-02-19 RX ADMIN — FORMOTEROL FUMARATE DIHYDRATE 20 MCG: 20 SOLUTION RESPIRATORY (INHALATION) at 08:15

## 2024-02-19 RX ADMIN — CEFTRIAXONE 2 G: 2 INJECTION, POWDER, FOR SOLUTION INTRAMUSCULAR; INTRAVENOUS at 21:06

## 2024-02-19 RX ADMIN — POTASSIUM CHLORIDE 40 MEQ: 1500 TABLET, EXTENDED RELEASE ORAL at 21:44

## 2024-02-19 RX ADMIN — HYDROCORTISONE: 25 CREAM TOPICAL at 21:44

## 2024-02-19 RX ADMIN — IPRATROPIUM BROMIDE AND ALBUTEROL SULFATE 3 ML: 2.5; .5 SOLUTION RESPIRATORY (INHALATION) at 08:16

## 2024-02-19 RX ADMIN — FORMOTEROL FUMARATE DIHYDRATE 20 MCG: 20 SOLUTION RESPIRATORY (INHALATION) at 20:35

## 2024-02-19 RX ADMIN — SODIUM CHLORIDE 100 ML/HR: 9 INJECTION, SOLUTION INTRAVENOUS at 21:06

## 2024-02-19 RX ADMIN — BENZONATATE 100 MG: 100 CAPSULE ORAL at 05:42

## 2024-02-19 RX ADMIN — OSELTAMAVIR PHOSPHATE 75 MG: 75 CAPSULE ORAL at 08:51

## 2024-02-19 RX ADMIN — BUDESONIDE 0.5 MG: 0.5 INHALANT RESPIRATORY (INHALATION) at 20:35

## 2024-02-19 RX ADMIN — BUDESONIDE 0.5 MG: 0.5 INHALANT RESPIRATORY (INHALATION) at 08:15

## 2024-02-19 RX ADMIN — SODIUM CHLORIDE 100 ML/HR: 9 INJECTION, SOLUTION INTRAVENOUS at 10:16

## 2024-02-19 RX ADMIN — IPRATROPIUM BROMIDE AND ALBUTEROL SULFATE 3 ML: 2.5; .5 SOLUTION RESPIRATORY (INHALATION) at 13:53

## 2024-02-19 RX ADMIN — BENZONATATE 100 MG: 100 CAPSULE ORAL at 21:44

## 2024-02-19 ASSESSMENT — ENCOUNTER SYMPTOMS
ABDOMINAL PAIN: 0
VOMITING: 0
COUGH: 1
ABDOMINAL DISTENTION: 0
FEVER: 0
FATIGUE: 1
SHORTNESS OF BREATH: 1

## 2024-02-19 ASSESSMENT — PAIN SCALES - GENERAL
PAINLEVEL_OUTOF10: 0 - NO PAIN

## 2024-02-19 ASSESSMENT — COGNITIVE AND FUNCTIONAL STATUS - GENERAL
DAILY ACTIVITIY SCORE: 24
MOBILITY SCORE: 24
MOBILITY SCORE: 24
DAILY ACTIVITIY SCORE: 24

## 2024-02-19 ASSESSMENT — PAIN SCALES - WONG BAKER
WONGBAKER_NUMERICALRESPONSE: NO HURT

## 2024-02-19 ASSESSMENT — PAIN - FUNCTIONAL ASSESSMENT: PAIN_FUNCTIONAL_ASSESSMENT: 0-10

## 2024-02-19 NOTE — PROGRESS NOTES
"[facility] Hospitalist Progress Note        Nai Sultana is a 38 y.o. female on day 1 of admission presenting with Influenza B.    Subjective   NAEON. Overall stable, does not complain of new acute issues. She does feel her cough/SOB is slowly improving. She has a bit more energy today and her apatite is coming back.    Review of Systems   Constitutional:  Positive for fatigue. Negative for fever.   Respiratory:  Positive for cough and shortness of breath.    Cardiovascular:  Negative for chest pain.   Gastrointestinal:  Negative for abdominal distention, abdominal pain and vomiting.       Objective     Physical Exam  Constitutional:       General: She is not in acute distress.  Cardiovascular:      Rate and Rhythm: Normal rate and regular rhythm.   Pulmonary:      Effort: Pulmonary effort is normal.      Breath sounds: Wheezing present.   Abdominal:      General: There is no distension.      Palpations: Abdomen is soft.   Neurological:      Mental Status: She is alert. Mental status is at baseline.         Last Recorded Vitals  Blood pressure 100/65, pulse 82, temperature 36.9 °C (98.4 °F), temperature source Oral, resp. rate 16, height 1.6 m (5' 3\"), weight 65.8 kg (145 lb), SpO2 94 %.  Intake/Output last 3 Shifts:  I/O last 3 completed shifts:  In: 5526.7 (84 mL/kg) [P.O.:1820; I.V.:2773.3 (42.2 mL/kg); IV Piggyback:933.3]  Out: - (0 mL/kg)   Weight: 65.8 kg     Relevant Results  Results for orders placed or performed during the hospital encounter of 02/17/24 (from the past 24 hour(s))   Legionella Antigen, Urine    Specimen: Urine   Result Value Ref Range    L. pneumophila Urine Ag Negative Negative   Streptococcus pneumoniae Antigen, Urine    Specimen: Urine   Result Value Ref Range    Streptococcus pneumoniae Ag, Urine Negative Negative   Lactate   Result Value Ref Range    Lactate 0.6 0.4 - 2.0 mmol/L   CBC   Result Value Ref Range    WBC 8.4 4.4 - 11.3 x10*3/uL    nRBC 0.0 0.0 - 0.0 /100 WBCs    RBC 3.84 (L) " 4.00 - 5.20 x10*6/uL    Hemoglobin 11.2 (L) 12.0 - 16.0 g/dL    Hematocrit 33.7 (L) 36.0 - 46.0 %    MCV 88 80 - 100 fL    MCH 29.2 26.0 - 34.0 pg    MCHC 33.2 32.0 - 36.0 g/dL    RDW 12.8 11.5 - 14.5 %    Platelets 276 150 - 450 x10*3/uL   Comprehensive metabolic panel   Result Value Ref Range    Glucose 163 (H) 74 - 99 mg/dL    Sodium 130 (L) 136 - 145 mmol/L    Potassium 3.3 (L) 3.5 - 5.3 mmol/L    Chloride 100 98 - 107 mmol/L    Bicarbonate 22 21 - 32 mmol/L    Anion Gap 11 10 - 20 mmol/L    Urea Nitrogen 3 (L) 6 - 23 mg/dL    Creatinine 0.52 0.50 - 1.05 mg/dL    eGFR >90 >60 mL/min/1.73m*2    Calcium 7.6 (L) 8.6 - 10.3 mg/dL    Albumin 3.0 (L) 3.4 - 5.0 g/dL    Alkaline Phosphatase 52 33 - 110 U/L    Total Protein 6.4 6.4 - 8.2 g/dL    AST 14 9 - 39 U/L    Bilirubin, Total 0.2 0.0 - 1.2 mg/dL    ALT 18 7 - 45 U/L       Imaging Results  ECG 12 lead    Result Date: 2/19/2024  Sinus tachycardia Otherwise normal ECG No previous ECGs available    XR chest 1 view    Result Date: 2/19/2024  Interpreted By:  Nicanor Zuniga, STUDY: XR CHEST 1 VIEW;  2/19/2024 2:43 am   INDICATION: Signs/Symptoms:Influenza, bilateral PNA, worsening hypoxia.   COMPARISON: 2/17/2024   ACCESSION NUMBER(S): [id number]   ORDERING CLINICIAN: RAFAEL BECKETT   FINDINGS: The cardiac silhouette is normal in size. There is interval development of bibasilar airspace opacities compatible pneumonia. There are also likely small pleural effusions. No pneumothorax.       Interval development of bibasilar airspace opacities compatible with pneumonia.   MACRO: None   Signed by: Nicanor Zuniga 2/19/2024 4:07 AM Dictation workstation:   [id number]    CT angio chest for pulmonary embolism    Result Date: 2/17/2024  STUDY: CT Angiogram of the Chest;  02/17/2024 9:59 PM INDICATION: Tachycardia, tachypnea, hypoxia on oxygen 3L. COMPARISON: XR chest 02/17/2024.  ACCESSION NUMBER(S): [id number] ORDERING CLINICIAN: WAQAS TORRES TECHNIQUE:  CTA of the chest was  performed with intravenous contrast. Images are reviewed and processed at a workstation according to the CT angiogram protocol with 3-D and/or MIP post processing imaging generated.  Omnipaque 350:60 mL was administered intravenously. Automated mA/kV exposure control was utilized and patient examination was performed in strict accordance with principles of ALARA. FINDINGS: Pulmonary arteries are adequately opacified without acute or chronic filling defects.  The thoracic aorta is normal in course and caliber without dissection or aneurysm. The heart is normal in size without pericardial effusion.  Thoracic lymph nodes are not enlarged. There is no pleural effusion, pleural thickening, or pneumothorax. The airways are patent. Lung bases demonstrate patchy airspace opacities and basilar atelectasis.  Findings greater in the right lower lobe. Upper abdomen demonstrates no acute pathology.  Slight fatty liver morphology. There are no acute fractures.  No suspicious bony lesions.  There are mild degenerative changes in the thoracic spine with multilevel Schmorl's node deformities.  There is mild superior endplate concavity at T4 and T5.  No acute fractures demonstrated.    No pulmonary artery emboli. Bilateral lower lobe predominant pneumonia and atelectasis. Signed by Casey Dotson,     XR chest 2 views    Result Date: 2/17/2024  Interpreted By:  Dean Sharpe, STUDY: XR CHEST 2 VIEWS  2/17/2024 5:06 pm   INDICATION: Signs/Symptoms:cf pna, sob, hypoxic   COMPARISON: None.   ACCESSION NUMBER(S): [id number]   ORDERING CLINICIAN: WAQAS TORRES   TECHNIQUE: PA and lateral views of the chest were obtained.   FINDINGS: Hazy opacity is seen at the left lung base and may represent scarring, atelectasis and/or pneumonia. No pleural effusion or pneumothorax is identified. The cardiac silhouette is within normal limits for size.       Left basilar airspace consolidation, as above. Clinical correlation and continued  follow-up until clearing is recommended.   MACRO: None.   Signed by: Dean Sharpe 2/17/2024 5:25 PM Dictation workstation:   [id number]       Medications:  azithromycin, 500 mg, intravenous, q24h  budesonide, 0.5 mg, nebulization, BID  cefTRIAXone, 2 g, intravenous, q24h  formoterol, 20 mcg, nebulization, BID  ipratropium-albuteroL, 3 mL, nebulization, TID  melatonin, 3 mg, oral, Daily  oseltamivir, 75 mg, oral, BID  sodium chloride, 500 mL, intravenous, Once       PRN medications: acetaminophen, acetaminophen, benzonatate, fluticasone, guaiFENesin, guaiFENesin, ipratropium-albuteroL, ondansetron ODT **OR** ondansetron, oxygen     Assessment/Plan     #Flu  #suspected superimposed bacterial PNA  #hx asthma  - at present can continue CTX/azithro/tamiflu. Follow urine antigens to see if we can stop azithro  - she doesn't really have significant wheeze or chest tightness. Does not feel like asthma is significantly exacerbated. Can hold off on PO steroids at the moment, if worsening can consider adding  - continue inhalers/nebz    DVT Prophylaxis:  Ambulate      Discharge Planning: Anticipate DC to home in next 24-48h    I personally examined the patient and reviewed chart, data, labs and radiology reports.  Plan of care was discussed with patient, all questions answered.    Total time spent: At least 38 minutes, providing counseling or in coordination of care. Total time on this day of visit includes record and documentation review before and after visit including documentation and time not explicitly included on EMR time stamp.    Te Shelley MD  [facility]

## 2024-02-19 NOTE — CARE PLAN
The patient's goals for the shift include Patient will maintain adequate oxygenation this shift    The clinical goals for the shift include Patient will maintain adequate oxygenation this shift

## 2024-02-19 NOTE — NURSING NOTE
Rapid Response RN Note    The following patient has a RADAR score of 9. Unit: [facility], Room: 528/8-A, T: 38.2 °C (100.7 °F) (Oral), P: 107, R: 19, BP: 92/57, PulseOx: 91%      This RN Reviewed above VS with bedside RN. Bedside nurse to assess patient and notify MD of findings and changes. AM labs placed per sepsis protocol

## 2024-02-19 NOTE — CARE PLAN
The patient's goals for the shift include Patient will maintain adequate oxygenation this shift    The clinical goals for the shift include no respiratory distrss this shift

## 2024-02-19 NOTE — NURSING NOTE
Rapid Response RN rounded on patient due to sepsis alert overnight. Lactate results normal. No concerns at this time.

## 2024-02-19 NOTE — PROGRESS NOTES
Pharmacy Medication History Review    Nai Sultana is a 38 y.o. female admitted for Influenza B. Pharmacy reviewed the patient's coytu-st-kphvupdha medications and allergies for accuracy.    The list below reflectives the updated PTA list. Please review each medication in order reconciliation for additional clarification and justification.  Prior to Admission Medications   Prescriptions Last Dose Informant     albuterol 90 mcg/actuation inhaler Past Week      Sig: Inhale 2 puffs every 4 hours if needed for wheezing or shortness of breath.   budesonide-formoteroL (Symbicort) 160-4.5 mcg/actuation inhaler 2/17/2024      Sig: Inhale 1 puff twice a day.                Facility-Administered Medications: None      Allergies  Reviewed by Nelli Moore DO on 2/17/2024        Severity Reactions Comments    House Dust Not Specified Other             Below are additional concerns with the patient's PTA list.  Patient verified all medications and doses.    Karin Ching CPhT

## 2024-02-20 ENCOUNTER — PHARMACY VISIT (OUTPATIENT)
Dept: PHARMACY | Facility: CLINIC | Age: 39
End: 2024-02-20
Payer: MEDICAID

## 2024-02-20 VITALS
TEMPERATURE: 98.1 F | RESPIRATION RATE: 18 BRPM | SYSTOLIC BLOOD PRESSURE: 100 MMHG | HEART RATE: 66 BPM | DIASTOLIC BLOOD PRESSURE: 58 MMHG | OXYGEN SATURATION: 93 % | BODY MASS INDEX: 25.69 KG/M2 | WEIGHT: 145 LBS | HEIGHT: 63 IN

## 2024-02-20 LAB
ATRIAL RATE: 111 BPM
P AXIS: 72 DEGREES
P OFFSET: 197 MS
P ONSET: 136 MS
PR INTERVAL: 170 MS
Q ONSET: 221 MS
QRS COUNT: 18 BEATS
QRS DURATION: 92 MS
QT INTERVAL: 322 MS
QTC CALCULATION(BAZETT): 437 MS
QTC FREDERICIA: 395 MS
R AXIS: 13 DEGREES
T AXIS: 62 DEGREES
T OFFSET: 382 MS
VENTRICULAR RATE: 111 BPM

## 2024-02-20 PROCEDURE — 99239 HOSP IP/OBS DSCHRG MGMT >30: CPT | Performed by: INTERNAL MEDICINE

## 2024-02-20 PROCEDURE — 94640 AIRWAY INHALATION TREATMENT: CPT

## 2024-02-20 PROCEDURE — 2500000001 HC RX 250 WO HCPCS SELF ADMINISTERED DRUGS (ALT 637 FOR MEDICARE OP): Performed by: HOSPITALIST

## 2024-02-20 PROCEDURE — 2500000002 HC RX 250 W HCPCS SELF ADMINISTERED DRUGS (ALT 637 FOR MEDICARE OP, ALT 636 FOR OP/ED): Performed by: PHARMACIST

## 2024-02-20 PROCEDURE — 2500000004 HC RX 250 GENERAL PHARMACY W/ HCPCS (ALT 636 FOR OP/ED): Performed by: STUDENT IN AN ORGANIZED HEALTH CARE EDUCATION/TRAINING PROGRAM

## 2024-02-20 PROCEDURE — RXMED WILLOW AMBULATORY MEDICATION CHARGE

## 2024-02-20 PROCEDURE — 2500000001 HC RX 250 WO HCPCS SELF ADMINISTERED DRUGS (ALT 637 FOR MEDICARE OP): Performed by: INTERNAL MEDICINE

## 2024-02-20 PROCEDURE — 2500000002 HC RX 250 W HCPCS SELF ADMINISTERED DRUGS (ALT 637 FOR MEDICARE OP, ALT 636 FOR OP/ED): Performed by: HOSPITALIST

## 2024-02-20 RX ORDER — LEVOFLOXACIN 750 MG/1
750 TABLET ORAL DAILY
Qty: 5 TABLET | Refills: 0 | Status: SHIPPED | OUTPATIENT
Start: 2024-02-20 | End: 2024-02-25

## 2024-02-20 RX ORDER — BENZONATATE 100 MG/1
100 CAPSULE ORAL 3 TIMES DAILY PRN
Qty: 20 CAPSULE | Refills: 0 | Status: SHIPPED | OUTPATIENT
Start: 2024-02-20

## 2024-02-20 RX ORDER — OSELTAMIVIR PHOSPHATE 75 MG/1
75 CAPSULE ORAL 2 TIMES DAILY
Qty: 6 CAPSULE | Refills: 0 | Status: SHIPPED | OUTPATIENT
Start: 2024-02-20 | End: 2024-02-23

## 2024-02-20 RX ADMIN — IPRATROPIUM BROMIDE AND ALBUTEROL SULFATE 3 ML: 2.5; .5 SOLUTION RESPIRATORY (INHALATION) at 08:37

## 2024-02-20 RX ADMIN — BENZONATATE 100 MG: 100 CAPSULE ORAL at 06:48

## 2024-02-20 RX ADMIN — FORMOTEROL FUMARATE DIHYDRATE 20 MCG: 20 SOLUTION RESPIRATORY (INHALATION) at 08:37

## 2024-02-20 RX ADMIN — HYDROCORTISONE: 25 CREAM TOPICAL at 08:48

## 2024-02-20 RX ADMIN — SODIUM CHLORIDE 100 ML/HR: 9 INJECTION, SOLUTION INTRAVENOUS at 06:50

## 2024-02-20 RX ADMIN — OSELTAMAVIR PHOSPHATE 75 MG: 75 CAPSULE ORAL at 08:48

## 2024-02-20 RX ADMIN — BUDESONIDE 0.5 MG: 0.5 INHALANT RESPIRATORY (INHALATION) at 08:37

## 2024-02-20 ASSESSMENT — COGNITIVE AND FUNCTIONAL STATUS - GENERAL
MOBILITY SCORE: 24
DAILY ACTIVITIY SCORE: 24

## 2024-02-20 ASSESSMENT — PAIN SCALES - GENERAL: PAINLEVEL_OUTOF10: 0 - NO PAIN

## 2024-02-20 ASSESSMENT — PAIN SCALES - WONG BAKER: WONGBAKER_NUMERICALRESPONSE: NO HURT

## 2024-02-20 NOTE — NURSING NOTE
Pt is asking for an order for Hydrocortisone topical cream, noted was generalized red rash mainly to face and BUE. She stated she uses Hydrocortisone cream at home. Pt witch c/o itching as well. Also I noticed her potassium has been 3.3 for the last 2 days with no replacement, Pt stating she can tell her potassium is low. Dr. Kennedy notified. New orders placed .

## 2024-02-20 NOTE — CARE PLAN
Problem: Pain  Goal: My pain/discomfort is manageable  Outcome: Progressing     Problem: Safety  Goal: Patient will be injury free during hospitalization  Outcome: Progressing  Goal: I will remain free of falls  Outcome: Progressing     Problem: Daily Care  Goal: Daily care needs are met  Outcome: Progressing     Problem: Psychosocial Needs  Goal: Demonstrates ability to cope with hospitalization/illness  Outcome: Progressing  Goal: Collaborate with me, my family, and caregiver to identify my specific goals  Outcome: Progressing     Problem: Skin  Goal: Prevent/manage excess moisture  Outcome: Progressing  Goal: Prevent/minimize sheer/friction injuries  Outcome: Progressing  Goal: Promote/optimize nutrition  Outcome: Progressing     Problem: Respiratory  Goal: No signs of respiratory distress (eg. Use of accessory muscles. Peds grunting)  Outcome: Progressing   The patient's goals for the shift include Patient will maintain adequate oxygenation this shift    The clinical goals for the shift include no respiratory distrss this shift

## 2024-02-20 NOTE — DISCHARGE SUMMARY
Discharge Diagnosis  Influenza B      Discharge Meds     Your medication list        START taking these medications        Instructions Last Dose Given Next Dose Due   benzonatate 100 mg capsule  Commonly known as: Tessalon      Take 1 capsule (100 mg) by mouth 3 times a day as needed for cough. Do not crush or chew.       levoFLOXacin 750 mg tablet  Commonly known as: Levaquin      Take 1 tablet (750 mg) by mouth once daily for 5 days.       oseltamivir 75 mg capsule  Commonly known as: Tamiflu      Take 1 capsule (75 mg) by mouth 2 times a day for 3 days.              CONTINUE taking these medications        Instructions Last Dose Given Next Dose Due   albuterol 90 mcg/actuation inhaler           budesonide-formoteroL 160-4.5 mcg/actuation inhaler  Commonly known as: Symbicort           montelukast 10 mg tablet  Commonly known as: Singulair                     Where to Get Your Medications        These medications were sent to [facility]  [address], [address], [facility] [address]      Hours: 8 AM to 6 PM Mon-Fri, 9 AM to 1 PM Saturday Phone: [phone]   benzonatate 100 mg capsule  levoFLOXacin 750 mg tablet  oseltamivir 75 mg capsule         Test Results Pending At Discharge  Pending Labs       Order Current Status    Blood Culture Preliminary result    Blood Culture Preliminary result            Hospital Course   Admitted and treated for influenza B with superimposed bacterial PNA. Treated with tamiflu and iv abx with clinical improvement. She is now tolerating room air. Will dc home with tamiflu and abx course to complete.     Pt clinically and hemodynamically stable, tolerating PO intake and room air.   Instructed to F/U with PCP within 5 days.   She understands and agrees with the dc plan.     More than 30 min spent on dc.       Pertinent Physical Exam At Time of Discharge  Physical Exam  Constitutional:       Appearance: Normal appearance.   Cardiovascular:      Rate and Rhythm: Normal  "rate and regular rhythm.   Pulmonary:      Effort: Pulmonary effort is normal.      Breath sounds: Normal breath sounds.   Abdominal:      General: Abdomen is flat. Bowel sounds are normal.      Palpations: Abdomen is soft.   Musculoskeletal:      Cervical back: Normal range of motion.   Skin:     General: Skin is warm.   Neurological:      General: No focal deficit present.      Mental Status: She is alert and oriented to person, place, and time. Mental status is at baseline.   Psychiatric:         Mood and Affect: Mood normal.         Behavior: Behavior normal.         Thought Content: Thought content normal.         Judgment: Judgment normal.     /58 (BP Location: Left arm, Patient Position: Sitting)   Pulse 66   Temp 36.7 °C (98.1 °F)   Resp 18   Ht 1.6 m (5' 3\")   Wt 65.8 kg (145 lb)   SpO2 93%   BMI 25.69 kg/m²       Outpatient Follow-Up  No future appointments.      Kirk Zepeda MD  "

## 2024-02-20 NOTE — CARE PLAN
The patient's goals for the shift include Patient will maintain adequate oxygenation this shift    The clinical goals for the shift include Pt will remain HDS

## 2024-02-22 LAB
BACTERIA BLD CULT: NORMAL
BACTERIA BLD CULT: NORMAL

## 2024-02-28 LAB
ATRIAL RATE: 75 BPM
P AXIS: 53 DEGREES
P OFFSET: 182 MS
P ONSET: 142 MS
PR INTERVAL: 158 MS
Q ONSET: 221 MS
QRS COUNT: 12 BEATS
QRS DURATION: 94 MS
QT INTERVAL: 384 MS
QTC CALCULATION(BAZETT): 428 MS
QTC FREDERICIA: 413 MS
R AXIS: 14 DEGREES
T AXIS: 30 DEGREES
T OFFSET: 413 MS
VENTRICULAR RATE: 75 BPM

## 2024-11-05 ENCOUNTER — OFFICE VISIT (OUTPATIENT)
Dept: URGENT CARE | Age: 39
End: 2024-11-05
Payer: COMMERCIAL

## 2024-11-05 VITALS
DIASTOLIC BLOOD PRESSURE: 71 MMHG | SYSTOLIC BLOOD PRESSURE: 106 MMHG | OXYGEN SATURATION: 98 % | HEART RATE: 81 BPM | TEMPERATURE: 98.1 F | RESPIRATION RATE: 18 BRPM

## 2024-11-05 DIAGNOSIS — B36.9 FUNGAL SKIN INFECTION: Primary | ICD-10-CM

## 2024-11-05 PROCEDURE — 99213 OFFICE O/P EST LOW 20 MIN: CPT | Performed by: PHYSICIAN ASSISTANT

## 2024-11-05 PROCEDURE — 1036F TOBACCO NON-USER: CPT | Performed by: PHYSICIAN ASSISTANT

## 2024-11-05 RX ORDER — HYDROCORTISONE 1 %
CREAM (GRAM) TOPICAL
COMMUNITY

## 2024-11-05 RX ORDER — FLUCONAZOLE 150 MG/1
TABLET ORAL
Qty: 7 TABLET | Refills: 0 | Status: SHIPPED | OUTPATIENT
Start: 2024-11-05

## 2024-11-05 NOTE — PROGRESS NOTES
Subjective   Patient ID: Nai Sultana is a 39 y.o. female. They present today with a chief complaint of Tinea (Ring worm again. Rash on hips for 2 weeks).    History of Present Illness  HPI  Patient presents to the urgent care is a 39-year-old female with fungal skin infection on thighs.  Patient reports a long history of reoccurrence of fungal infections, and eczema.  Patient states she has been applying clotrimazole-betamethasone but rash has continued to spread.  Patient states she feels the fungal rash starting on her face. Patient is currently seeing a dermatologist, and has an appointment scheduled for 12/6/2024.     Past Medical History  Allergies as of 11/05/2024 - Reviewed 11/05/2024   Allergen Reaction Noted    House dust Other 10/17/2018       (Not in a hospital admission)       Past Medical History:   Diagnosis Date    Personal history of other diseases of the respiratory system     History of asthma       Past Surgical History:   Procedure Laterality Date    OTHER SURGICAL HISTORY  10/19/2021    Oral surgery        reports that she has quit smoking. Her smoking use included cigarettes. She has never used smokeless tobacco.    Review of Systems  Review of Systems     Patient denies changes in medication, foods, laundry detergents, lotions, soap, new foods, numbness, tingling, discharge, sore throat, fever, dizziness, shortness of breath, increased urinary frequency, chills, peripheral edema, abdominal pain, chest pain and myalgias.                          Objective    Vitals:    11/05/24 0815   BP: 106/71   Pulse: 81   Resp: 18   Temp: 36.7 °C (98.1 °F)   SpO2: 98%     Patient's last menstrual period was 10/21/2024 (approximate).    Physical Exam  Appearance: Alert, oriented, cooperative, in no acute distress. Well nourished & well hydrated.    Skin: no petechiae or purpura.   3-4 nummular lesion w raised boarders and central clearing on right lateral thigh, faint nummular lesions on left lateral  thigh, flaking along edge, no signs of excoriation, no tenderness to palpation, no surrounding erythema, edema, no satellite lesions    Eyes: Conjunctiva pink with no redness or exudates. Eyelids without lesions. No scleral icterus.     ENT: Hearing grossly intact. External inspection of ears without lesions or erythema. no nasal flaring. no tripoding, no drooling, no difficulty swallowing oral secretions.    Pulmonary:  Good chest wall excursion. No accessory muscle use or stridor.    Musculoskeletal: no deformity. No cyanosis, clubbing.     Neurological: no focal findings identified.  Procedures    Point of Care Test & Imaging Results from this visit  No results found for this visit on 11/05/24.   No results found.    Diagnostic study results (if any) were reviewed by Vane Aldridge PA-C.    Assessment/Plan   Allergies, medications, history, and pertinent labs/EKGs/Imaging reviewed by Vane Aldridge PA-C.     Medical Decision Making  Considerations for patient's symptoms include fungal, cellulitis, contact dermatitis, autoimmune, Erythema annulare centrifugum, Granuloma annulare. Physical exam: 1 cm nummular lesion w raised boarders and central clearing, flaking along edge, no signs of excoriation, no tenderness to palpation, no surrounding erythema, edema, no satellite lesions. Patient denies any prior history of autoimmune diseases, given appearance and history, fungal considered the most likely diagnosis at this time. Patient will begin use of Fluconazole.  Patient has had treatment failure with clotrimazole-betamethasone.  Patient has follow-up appointment with dermatologist 12/6/2024.  Patient verbalizes understanding and agrees with plan of care.  All questions were answered.   Dictation software was used in the creation of this note which does not evaluate or correct for typographical, spelling, syntax or grammatical errors.    Orders and Diagnoses  There are no diagnoses linked to this  encounter.    Medical Admin Record      Patient disposition: Home    Electronically signed by Vane Aldridge PA-C  8:28 AM

## 2024-11-12 ENCOUNTER — OFFICE VISIT (OUTPATIENT)
Dept: URGENT CARE | Age: 39
End: 2024-11-12
Payer: COMMERCIAL

## 2024-11-12 VITALS
HEIGHT: 63 IN | BODY MASS INDEX: 24.8 KG/M2 | HEART RATE: 73 BPM | TEMPERATURE: 98.2 F | SYSTOLIC BLOOD PRESSURE: 144 MMHG | WEIGHT: 140 LBS | OXYGEN SATURATION: 95 % | DIASTOLIC BLOOD PRESSURE: 74 MMHG

## 2024-11-12 DIAGNOSIS — B36.9 FUNGAL SKIN INFECTION: Primary | ICD-10-CM

## 2024-11-12 PROCEDURE — 99213 OFFICE O/P EST LOW 20 MIN: CPT | Performed by: PHYSICIAN ASSISTANT

## 2024-11-12 PROCEDURE — 1036F TOBACCO NON-USER: CPT | Performed by: PHYSICIAN ASSISTANT

## 2024-11-12 PROCEDURE — 3008F BODY MASS INDEX DOCD: CPT | Performed by: PHYSICIAN ASSISTANT

## 2024-11-12 RX ORDER — FLUCONAZOLE 200 MG/1
TABLET ORAL
Qty: 7 TABLET | Refills: 1 | Status: SHIPPED | OUTPATIENT
Start: 2024-11-12

## 2024-11-12 NOTE — PATIENT INSTRUCTIONS
"Home Care:  1) Use medication as directed.  2) Follow up with your primary care if symptoms worsen or do not improve.    What are ringworm, athlete's foot, and jock itch? - They are skin infections caused by a fungus. These types of fungal infections are also called \"tinea.\"  Some people call these fungal infections \"ringworm,\" because they often cause a ring-shaped, red, itchy rash on the skin. But a ring-shaped rash is not always there. People with athlete's foot might instead have moist, raw skin between their toes, or flaking skin on the bottoms of their feet. People with jock itch often just have a red rash on the groin.  Sometimes, especially in children, the fungus can infect the scalp. On the scalp, the infection can look like a bald spot or a round flaky patch of skin.    How did I get a fungal infection?   - You can catch fungal infections from anyone who is infected. You can also catch them from an infected dog or cat. Plus, you can  the infections from places where the fungus might be, such as:  -A shower stall  -The locker room floor  -The area near a pool  If you have a fungal infection on one part of your body, you can also spread it to other parts. For instance, men with a fungal infection on their feet sometimes spread it to their groin.    How do I keep from getting a fungal infection again?   - If someone in your home has had a fungal infection on their scalp:  -Get rid of any holland, brushes, barrettes, or other hair products that could have the fungus on them  -If the fungal infection might have come from a pet, have it checked by a vet    Here are some other general tips on how to prevent fungal infections:  -Do not share unwashed clothes, sports gear, or towels with other people  -Always wear slippers or sandals when at the gym, pool, or other public areas. That includes public showers.  -Wash with soap and shampoo after sports or exercise  -Change your socks and underwear at least once " a day  -Keep your skin clean and dry. Always dry yourself well after swimming or showering.

## 2024-11-12 NOTE — PROGRESS NOTES
"Subjective   Patient ID: Nai Sultana is a 39 y.o. female. They present today with a chief complaint of Recurrent Skin Infections (Was seen last week for same symptoms. ).    History of Present Illness  HPI  Patient presents to the urgent care is a 39-year-old female with recurrent fungal skin infection.  Patient reports the last time she was here she was put on fluconazole and symptoms have drastically improved.  Patient states she was on antibiotics Augmentin for 10 days, which seemed to worsen the rash.  Patient states her rash is almost completely resolved, but does not have anymore fluconazole.  Past Medical History  Allergies as of 11/12/2024 - Reviewed 11/12/2024   Allergen Reaction Noted    House dust Other 10/17/2018       (Not in a hospital admission)       Past Medical History:   Diagnosis Date    Personal history of other diseases of the respiratory system     History of asthma       Past Surgical History:   Procedure Laterality Date    OTHER SURGICAL HISTORY  10/19/2021    Oral surgery        reports that she has quit smoking. Her smoking use included cigarettes. She has never been exposed to tobacco smoke. She has never used smokeless tobacco. She reports that she does not drink alcohol and does not use drugs.    Review of Systems  Review of Systems     Patient denies changes in medication, foods, laundry detergents, lotions, soap, new foods, numbness, tingling, discharge, sore throat, fever, dizziness, shortness of breath, increased urinary frequency, chills, peripheral edema, abdominal pain, chest pain and myalgias.                          Objective    Vitals:    11/12/24 0809   BP: 144/74   BP Location: Right arm   Patient Position: Sitting   BP Cuff Size: Adult   Pulse: 73   Temp: 36.8 °C (98.2 °F)   TempSrc: Oral   SpO2: 95%   Weight: 63.5 kg (140 lb)   Height: 1.6 m (5' 3\")     Patient's last menstrual period was 10/21/2024 (approximate).    Physical Exam  Appearance: Alert, oriented, " cooperative, in no acute distress. Well nourished & well hydrated.    Skin: no petechiae or purpura. Good skin turgor, 2 raised faint erythematous lesions on forehead, 4-5 erythematous raised lesions on right lateral thigh, flaking, and 2 on left lateral thigh, 2-3 faint pink papules on forearms w flaking    Eyes: Conjunctiva pink with no redness or exudates. Eyelids without lesions. No scleral icterus.     ENT: Hearing grossly intact. External inspection of ears without lesions or erythema. no nasal flaring. moist oral mucosa, no tripoding, no drooling, no difficulty swallowing oral secretions.    Pulmonary:  Good chest wall excursion. No accessory muscle use or stridor. no difficulty completing full sentences without taking a breath    Cardiac:  No JVD.     Musculoskeletal: no deformity. No cyanosis, clubbing.     Neurological: no focal findings identified.    Psychiatric: Appropriate mood and affect.    Procedures    Point of Care Test & Imaging Results from this visit  No results found for this visit on 11/12/24.   No results found.    Diagnostic study results (if any) were reviewed by Vane Aldridge PA-C.    Assessment/Plan   Allergies, medications, history, and pertinent labs/EKGs/Imaging reviewed by Vane Aldridge PA-C.     Medical Decision Making  Patient presents to the urgent care is a 39-year-old female with recurrent fungal skin infection.  Patient reports her symptoms have significantly improved, almost resolved since her last visit to the urgent care.  Patient was treated with fluconazole.  Patient reports taking Augmentin for 10 days, and her skin did worsen after taking the antibiotic.  Patient reports having a sore throat at that time, which did not improve with Augmentin, patient reports her sore throat stopped immediately when she began fluconazole.  Patient is on an oral steroid inhaler, sore throat may have been due to thrush.  Patient has an appointment with a dermatologist on  12/6/2024.  Patient does report a history of eczema, does not feel like she has been struggling with that.  Patient is already tried applying steroids, clotrimazole/betamethasone cream which has caused some skin lightening, and desonide.  Patient feels her rash only improved with oral fluconazole.  Patient will be placed back on fluconazole, advised to keep her dermatology appointment.  Patient denies any history of diabetes, or being immunocompromised.    Orders and Diagnoses  There are no diagnoses linked to this encounter.    Medical Admin Record      Patient disposition: Home    Electronically signed by Vane Aldridge PA-C  8:13 AM

## 2024-11-21 ENCOUNTER — OFFICE VISIT (OUTPATIENT)
Dept: URGENT CARE | Age: 39
End: 2024-11-21
Payer: COMMERCIAL

## 2024-11-21 VITALS
SYSTOLIC BLOOD PRESSURE: 114 MMHG | RESPIRATION RATE: 18 BRPM | TEMPERATURE: 97.7 F | OXYGEN SATURATION: 96 % | DIASTOLIC BLOOD PRESSURE: 79 MMHG | HEART RATE: 67 BPM

## 2024-11-21 DIAGNOSIS — T78.40XD ALLERGIC REACTION, SUBSEQUENT ENCOUNTER: Primary | ICD-10-CM

## 2024-11-21 NOTE — PROGRESS NOTES
Subjective   Patient ID: Nai Sultana is a 39 y.o. female. They present today with a chief complaint of Recurrent Skin Infections (Ringworm. Her skin is flaring up, and was extremely itchy last night. Patient says she has one more week of fluconazole but her dermatologist appointment is dec 6th and she is worried of the flare up that will happen in the 1 week period when she does not have medication. ).    History of Present Illness  HPI  This is a 39-year-old female who was seen in this urgent care twice in the past and was diagnosed with ringworm and was placed on fluconazole returns today complaining of recurrent symptoms onset last evening.  Patient states her skin has flared up last evening was extremely itchy.  She denies any shortness of breath.  Patient is requesting a refill on her fluconazole medication.  Past Medical History  Allergies as of 11/21/2024 - Reviewed 11/21/2024   Allergen Reaction Noted    House dust Other 10/17/2018       (Not in a hospital admission)       Past Medical History:   Diagnosis Date    Personal history of other diseases of the respiratory system     History of asthma       Past Surgical History:   Procedure Laterality Date    OTHER SURGICAL HISTORY  10/19/2021    Oral surgery        reports that she has quit smoking. Her smoking use included cigarettes. She has never been exposed to tobacco smoke. She has never used smokeless tobacco. She reports that she does not drink alcohol and does not use drugs.    Review of Systems  Review of Systems   Skin:  Positive for rash.   All other systems reviewed and are negative.                                 Objective    Vitals:    11/21/24 1036   BP: 114/79   Pulse: 67   Resp: 18   Temp: 36.5 °C (97.7 °F)   SpO2: 96%     Patient's last menstrual period was 11/10/2024 (approximate).    Physical Exam  Patient is awake alert oriented x 3 in no acute distress vital signs are stable.  She is afebrile.  Skin examination reveals diffuse  erythema over the face and neck region pruritic in nature.  Airway is patent.  Resolving erythema was also noted over the lateral aspect of her right hip region.  Lungs are clear throughout.  Procedures    Point of Care Test & Imaging Results from this visit  No results found for this visit on 11/21/24.   No results found.    Diagnostic study results (if any) were reviewed by Virgil Worley DO.    Assessment/Plan   Allergies, medications, history, and pertinent labs/EKGs/Imaging reviewed by Virgil Worley DO.     Medical Decision Making  It is my opinion that the patient condition is not that of ringworms.  I believe the patient is having some allergic reaction.  Patient was referred to a dermatologist for further evaluation.  Appointment was made for her to be seen today at [facility] at 2:15 PM    Orders and Diagnoses  There are no diagnoses linked to this encounter.    Medical Admin Record      Patient disposition: Home    Electronically signed by Virgil Worley DO  11:07 AM

## 2025-06-27 ENCOUNTER — OFFICE VISIT (OUTPATIENT)
Dept: URGENT CARE | Age: 40
End: 2025-06-27
Payer: COMMERCIAL

## 2025-06-27 VITALS
RESPIRATION RATE: 20 BRPM | OXYGEN SATURATION: 98 % | HEART RATE: 74 BPM | SYSTOLIC BLOOD PRESSURE: 115 MMHG | DIASTOLIC BLOOD PRESSURE: 73 MMHG | TEMPERATURE: 98.3 F

## 2025-06-27 DIAGNOSIS — H10.32 ACUTE CONJUNCTIVITIS OF LEFT EYE, UNSPECIFIED ACUTE CONJUNCTIVITIS TYPE: Primary | ICD-10-CM

## 2025-06-27 RX ORDER — DILTIAZEM HYDROCHLORIDE 60 MG/1
2 TABLET, FILM COATED ORAL 2 TIMES DAILY
COMMUNITY
Start: 2025-06-02

## 2025-06-27 RX ORDER — TOBRAMYCIN 3 MG/ML
1 SOLUTION/ DROPS OPHTHALMIC EVERY 4 HOURS
Qty: 5 ML | Refills: 0 | Status: SHIPPED | OUTPATIENT
Start: 2025-06-27 | End: 2025-07-07

## 2025-06-27 RX ORDER — DUPILUMAB 300 MG/2ML
300 INJECTION, SOLUTION SUBCUTANEOUS
COMMUNITY
Start: 2025-03-07

## 2025-06-27 ASSESSMENT — ENCOUNTER SYMPTOMS
PSYCHIATRIC NEGATIVE: 1
CARDIOVASCULAR NEGATIVE: 1
EYE DISCHARGE: 1
RESPIRATORY NEGATIVE: 1
GASTROINTESTINAL NEGATIVE: 1
MUSCULOSKELETAL NEGATIVE: 1
NEUROLOGICAL NEGATIVE: 1
ENDOCRINE NEGATIVE: 1
LOSS OF SENSATION IN FEET: 0
HEMATOLOGIC/LYMPHATIC NEGATIVE: 1
OCCASIONAL FEELINGS OF UNSTEADINESS: 0
ALLERGIC/IMMUNOLOGIC NEGATIVE: 1
CONSTITUTIONAL NEGATIVE: 1
EYE ITCHING: 1
DEPRESSION: 0
EYE REDNESS: 1

## 2025-06-27 ASSESSMENT — VISUAL ACUITY: OU: 1

## 2025-06-27 ASSESSMENT — PATIENT HEALTH QUESTIONNAIRE - PHQ9
SUM OF ALL RESPONSES TO PHQ9 QUESTIONS 1 AND 2: 0
1. LITTLE INTEREST OR PLEASURE IN DOING THINGS: NOT AT ALL
2. FEELING DOWN, DEPRESSED OR HOPELESS: NOT AT ALL

## 2025-06-27 NOTE — PROGRESS NOTES
Subjective   Patient ID: Nai Sultana is a 39 y.o. female. They present today with a chief complaint of Eye Trauma and Eye Problem (Pt presents with what she believes is pink eye. She has cats. Has been going on for 2 weeks. ).    History of Present Illness    History provided by:  Patient   used: No    Eye Trauma  Location:  Left eye discharge  Severity:  Moderate  Onset quality:  Sudden  Duration:  2 weeks  Timing:  Constant  Progression:  Worsening  Chronicity:  New  Eye Problem  Associated symptoms: discharge, itching and redness        Past Medical History  Allergies as of 06/27/2025 - Reviewed 06/27/2025   Allergen Reaction Noted    Cat dander Other 03/30/2015    House dust Other 10/17/2018    Mold Other 03/30/2015       Prescriptions Prior to Admission[1]     Medical History[2]    Surgical History[3]     reports that she has quit smoking. Her smoking use included cigarettes. She has been exposed to tobacco smoke. She has never used smokeless tobacco. She reports that she does not drink alcohol and does not use drugs.    Review of Systems  Review of Systems   Constitutional: Negative.    HENT: Negative.     Eyes:  Positive for discharge, redness and itching.   Respiratory: Negative.     Cardiovascular: Negative.    Gastrointestinal: Negative.    Endocrine: Negative.    Genitourinary: Negative.    Musculoskeletal: Negative.    Allergic/Immunologic: Negative.    Neurological: Negative.    Hematological: Negative.    Psychiatric/Behavioral: Negative.                                    Objective    Vitals:    06/27/25 0837   BP: 115/73   BP Location: Right arm   Patient Position: Sitting   Pulse: 74   Resp: 20   Temp: 36.8 °C (98.3 °F)   SpO2: 98%     Patient's last menstrual period was 06/22/2025 (exact date).    Physical Exam  Vitals reviewed.   Constitutional:       Appearance: Normal appearance.   HENT:      Mouth/Throat:      Mouth: Mucous membranes are moist.   Eyes:      General: Lids  are normal. Lids are everted, no foreign bodies appreciated. Vision grossly intact. Gaze aligned appropriately.         Left eye: Discharge present.     Conjunctiva/sclera:      Left eye: Left conjunctiva is injected.   Cardiovascular:      Rate and Rhythm: Normal rate and regular rhythm.      Pulses: Normal pulses.      Heart sounds: Normal heart sounds.   Pulmonary:      Effort: Pulmonary effort is normal.      Breath sounds: Normal breath sounds.   Abdominal:      General: Bowel sounds are normal.      Palpations: Abdomen is soft.   Musculoskeletal:      Cervical back: Normal range of motion.   Skin:     General: Skin is warm.   Neurological:      General: No focal deficit present.      Mental Status: She is alert and oriented to person, place, and time.   Psychiatric:         Mood and Affect: Mood normal.         Behavior: Behavior normal.         Thought Content: Thought content normal.         Judgment: Judgment normal.         Procedures    Point of Care Test & Imaging Results from this visit  No results found for this visit on 06/27/25.   Imaging  No results found.    Cardiology, Vascular, and Other Imaging  No other imaging results found for the past 2 days      Diagnostic study results (if any) were reviewed by ABI Go.    Assessment/Plan   Allergies, medications, history, and pertinent labs/EKGs/Imaging reviewed by ABI Go.     Medical Decision Making  At time of discharge patient was clinically well-appearing and HDS for outpatient management. The patient and/or family was educated regarding diagnosis, supportive care, OTC and Rx medications. The patient and/or family was given the opportunity to ask questions prior to discharge.  They verbalized understanding of my discussion of the plans for treatment, expected course, indications to return to  or seek further evaluation in ED, and the need for timely follow up as directed.   They were provided with a  work/school excuse if requested.        Orders and Diagnoses  Diagnoses and all orders for this visit:  Acute conjunctivitis of left eye, unspecified acute conjunctivitis type  -     tobramycin (Tobrex) 0.3 % ophthalmic solution; Administer 1 drop into the left eye every 4 hours for 10 days.      Return to Urgent care if symptoms return or progress  Follow up with PCP in 1-2 weeks       Patient disposition: Home    Electronically signed by ABI Go  11:18 AM           [1] (Not in a hospital admission)  [2]   Past Medical History:  Diagnosis Date    Personal history of other diseases of the respiratory system     History of asthma   [3]   Past Surgical History:  Procedure Laterality Date    OTHER SURGICAL HISTORY  10/19/2021    Oral surgery

## 2025-07-06 ENCOUNTER — OFFICE VISIT (OUTPATIENT)
Dept: URGENT CARE | Age: 40
End: 2025-07-06
Payer: COMMERCIAL

## 2025-07-06 VITALS
TEMPERATURE: 98.3 F | HEART RATE: 67 BPM | DIASTOLIC BLOOD PRESSURE: 76 MMHG | RESPIRATION RATE: 16 BRPM | SYSTOLIC BLOOD PRESSURE: 110 MMHG | OXYGEN SATURATION: 99 %

## 2025-07-06 DIAGNOSIS — H10.32 ACUTE CONJUNCTIVITIS OF LEFT EYE, UNSPECIFIED ACUTE CONJUNCTIVITIS TYPE: Primary | ICD-10-CM

## 2025-07-06 DIAGNOSIS — J01.90 ACUTE NON-RECURRENT SINUSITIS, UNSPECIFIED LOCATION: ICD-10-CM

## 2025-07-06 PROCEDURE — 1036F TOBACCO NON-USER: CPT | Performed by: PHYSICIAN ASSISTANT

## 2025-07-06 PROCEDURE — 99214 OFFICE O/P EST MOD 30 MIN: CPT | Performed by: PHYSICIAN ASSISTANT

## 2025-07-06 RX ORDER — AMOXICILLIN AND CLAVULANATE POTASSIUM 875; 125 MG/1; MG/1
875 TABLET, FILM COATED ORAL 2 TIMES DAILY
Qty: 20 TABLET | Refills: 0 | Status: SHIPPED | OUTPATIENT
Start: 2025-07-06

## 2025-07-06 RX ORDER — OFLOXACIN 3 MG/ML
1 SOLUTION/ DROPS OPHTHALMIC 4 TIMES DAILY
Qty: 5 ML | Refills: 0 | Status: SHIPPED | OUTPATIENT
Start: 2025-07-06 | End: 2025-07-13

## 2025-07-06 RX ORDER — FLUCONAZOLE 150 MG/1
150 TABLET ORAL ONCE
Qty: 2 TABLET | Refills: 0 | Status: SHIPPED | OUTPATIENT
Start: 2025-07-06 | End: 2025-07-06

## 2025-07-06 NOTE — PATIENT INSTRUCTIONS
"Conjunctivitis (pink eye)    The Basics  Written by the doctors and editors at [facility]  What is pink eye?  This is a term to describe an infection or irritation of the eye. The medical term for pink eye is \"conjunctivitis.\"  If you have pink eye, your eye (or eyes) might:  ? Turn pink or red  ? Weep or ooze a gooey liquid  ? Become itchy or burn  ? Get stuck shut, especially when you first wake up  Pink eye can be caused by an infection, allergies, or an unknown irritation.  Can you catch pink eye from someone else?  Yes. When pink eye is caused by an infection, it can spread easily. Usually, people catch it from touching something that has been in contact with an infected person's eye. It can also be spread when an infected person touches someone else, and then that person touches their eye.  If someone you know has pink eye, avoid touching their pillowcases, towels, or other personal items.  Can pink eye be treated?  Most cases of pink eye go away on their own without treatment. When pink eye is caused by infection, it is usually caused by a virus, so antibiotics will not help. Still, pink eye caused by a virus can last several days.  Some types of pink eye can be treated:  ? Pink eye caused by an infection with bacteria usually goes away on its own. But it can be treated with antibiotic eye drops, gel, or ointment.  ? Pink eye caused by other problems can be treated with eye drops normally used for allergies. These drops do not cure the pink eye, but they can help with itchiness and irritation.  When using eye drops for infection, it is possible to spread the infection from 1 eye to the other. To avoid this:  ? Do not touch your healthy eye after touching your infected eye.  ? Do not touch the bottle or dropper directly onto 1 eye and then use it in the other.  If your eyelids feel swollen, you can hold a cool, wet cloth on the area.  What if I wear contact lenses?  If you wear contact lenses and you have " symptoms of pink eye, it is really important to have a doctor look at your eyes. This is because you might need antibiotics.  During treatment for eye infections:  ? Stop wearing your contacts for a short time.  ? If your contacts are disposable, throw them away and use new ones.  ? If your contacts are not disposable, clean them carefully.  ? Throw away your contact lens case, and get a new one.  When can I go back to work or school?  If you have pink eye caused by an infection, remember that it can spread very easily. The best way to avoid spreading it is to stay away from other people until you no longer have symptoms. If this is not possible, wash your hands often (figure 1). It is also important to avoid touching your eyes and sharing items that could spread the infection.  Schools and day cares usually have rules about when a child with pink eye can return. If they have a bacterial infection, they will probably need to stay home until they have been using antibiotic eye drops or ointment for 24 hours.  How can I help prevent pink eye?  To help prevent pink eye caused by an infection:  ? Wash your hands often with soap and water.  ? Avoid touching your eyes.  ? Avoid sharing towels, bedding, or other personal items with a person who has pink eye.  If your pink eye is caused by allergies, it might help to stay inside with the windows shut as much as possible during peak allergy seasons.  When should I call the doctor?  Call your doctor or nurse if:  ? You have trouble seeing clearly after blinking.  ? Your eye is still red or has drainage after 3 days.  ? You have eye pain that is getting worse.  If you do not have these problems, but think that you might have pink eye, your doctor or nurse might be able to give you advice over the phone.     Sinusitis in adults    The Basics  Written by the doctors and editors at [facility]  What is sinusitis?  This is a condition that can cause a stuffy nose, pain in the face,  "and discharge or \"mucus\" from the nose.  The sinuses are hollow areas in the bones of the face (figure 1). They have a thin lining that normally makes a small amount of mucus. When this lining gets irritated or infected, it swells and makes extra mucus. This causes symptoms.  Sinusitis usually happens after a person gets sick with a cold. The germs causing the cold can infect the sinuses, too. Sometimes, other germs can be the cause of the infection. Often, a person feels like their cold is getting better. But then, they get sinusitis and begin to feel sick again.  What are the symptoms of sinusitis?  Common symptoms include:  ? Stuffy or blocked nose  ? Thick white, yellow, or green discharge from the nose  ? Pain in the teeth  ? Pain or pressure in the face - This often feels worse when bending forward.  People with sinusitis can also have other symptoms, such as:  ? Fever  ? Cough  ? Trouble smelling  ? Ear pressure or fullness  ? Headache  ? Bad breath  ? Feeling tired  Most of the time, symptoms start to improve in 7 to 10 days.  How is sinusitis treated?  Most of the time, sinusitis does not need to be treated with antibiotic medicines. This is because most cases of sinusitis are caused by viruses, not bacteria, and antibiotics do not kill viruses. In fact, even sinusitis caused by bacteria usually gets better on its own without antibiotics.  Some people with sinusitis do need antibiotics. If your symptoms have not improved after 10 days, ask your doctor if you should take antibiotics. They might recommend that you wait 1 more week to see if your symptoms improve. But if you have symptoms such as a fever or a lot of pain, of if you have certain health conditions, they might prescribe antibiotics. If you do get them, follow all of your doctor's instructions about taking them.  What can I do on my own to feel better?  To help with your symptoms, you can:  ? Take an over-the-counter pain reliever to help with " "pain.  ? Rinse your nose and sinuses with salt water a few times a day - Ask your doctor or nurse about the best way to do this.  ? Drink plenty of fluids - Staying hydrated might help thin the mucus and make it drain more easily.  Your doctor might also recommend a steroid nose spray to reduce swelling in your nose, especially if you have allergies. Talk to your doctor if you are interested in this.  What if my symptoms do not get better?  Talk with your doctor or nurse. They might order tests to figure out why you still have symptoms. These can include:  ? CT scan or other imaging tests - These create pictures of the inside of the body.  ? A test to look inside the sinuses - A doctor puts a thin tube with a camera on the end into the nose and up into the sinuses.  Some people get a lot of sinus infections or have symptoms that last at least 3 months. This is a different type of sinusitis called \"chronic sinusitis.\" It can be caused by different things. For example, some people have growths inside their nose or sinuses called \"polyps.\" Other people have allergies that cause their symptoms.  Chronic sinusitis can be treated in different ways. If you have chronic sinusitis, talk with your doctor about which treatments are right for you.  When should I call the doctor?  See your doctor or nurse if your symptoms last more than 10 days, or if your symptoms first get better but then get worse.  Rarely, sinusitis can lead to serious problems. See your doctor or nurse right away (do not wait 10 days) if you have:  ? Fever higher than 102°F (38.9°C)  ? Sudden and severe pain in the face and head  ? Trouble seeing, or seeing double  ? Trouble thinking clearly  ? Swelling or redness around 1 or both eyes  ? Stiff neck  ? Trouble moving your eye normally, or pain with eye movement   "

## 2025-07-06 NOTE — PROGRESS NOTES
Subjective   Patient ID: Nai Sultana is a 39 y.o. female. They present today with a chief complaint of Conjunctivitis (Pt was seen last visit for the same issue, pink eye. No improvement ).    History of Present Illness    Conjunctivitis    This is a 39-year-old female on Dupixent for eczema presents urgent care for sinus pressure and pinkeye.  States that she started with left-sided pinkeye over 10 days ago.  She is having drainage.  Was seen in urgent care and given tobramycin eyedrops which have not helped.  States she has had sinus pressure for 3 weeks.  She is blowing yellow mucus from her nose.  Denies fevers, chills, eye pain, or vision issues.  Denies concern for pregnancy.  Past Medical History  Allergies as of 07/06/2025 - Reviewed 07/06/2025   Allergen Reaction Noted    Cat dander Other 03/30/2015    House dust Other 10/17/2018    Mold Other 03/30/2015       Prescriptions Prior to Admission[1]     Medical History[2]    Surgical History[3]     reports that she has quit smoking. Her smoking use included cigarettes. She has been exposed to tobacco smoke. She has never used smokeless tobacco. She reports that she does not drink alcohol and does not use drugs.    Review of Systems  Review of Systems   All other systems reviewed and are negative.                                 Objective    Vitals:    07/06/25 0820   BP: 110/76   Pulse: 67   Resp: 16   Temp: 36.8 °C (98.3 °F)   TempSrc: Oral   SpO2: 99%     Patient's last menstrual period was 06/22/2025 (exact date).    Physical Exam  Physical Exam:    General: Vitals noted no distress. Afebrile. Normal phonation, no stridor, no trismus    ENT: Left TM is unremarkable. Right TM is unremarkable. Left external auditory canal is unremarkable. Right external auditory canal is unremarkable. Mastoids nontender.  Injected left conjunctival.  Normal conjunctive on the right.  Eyelids and eyelashes are unremarkable.. Eyes are PERRLA. Posterior oropharynx without  exudate or swelling. Uvula is in the midline and nonedematous. No peritonsillar abscess. No Dilan's Angina.  Tender over the frontal sinuses bilaterally.    Neck: Supple. No meningismus through full range of motion. No anterior cervical lymphadenopathy. No posterior cervical chain lymphadenopathy    Cardiac: Regular rate rhythm    Lungs: Good aeration throughout. No adventitious breath sounds.    Extremities: No peripheral edema    Skin: No rash    Neuro: No gross neurological deficits      Procedures    Point of Care Test & Imaging Results from this visit  No results found for this visit on 07/06/25.   Imaging  No results found.    Cardiology, Vascular, and Other Imaging  No other imaging results found for the past 2 days      Diagnostic study results (if any) were reviewed by Palomo Alexander PA-C.    Assessment/Plan   Allergies, medications, history, and pertinent labs/EKGs/Imaging reviewed by Palomo Alexander PA-C.     Medical Decision Making  MDM:      Summary: This is a 39-year-old female here for pinkeye and sinus pressure. Vital signs were reviewed. Patient is well-appearing nontoxic on exam. Differential diagnosis includes but not limited to bacterial conjunctivitis, side effect of Dupixent, lingering viral URI, bacterial sinusitis.  I am concerned for bacterial sinusitis.  She has had constant maxillary sinus pain and pressure.  She is blowing green mucus from her nose.  Symptoms been ongoing for least 10 days.  Will treat her with Augmentin.  Did send fluconazole to her pharmacy in case she gets a yeast infection.  She was on tobramycin for left-sided pinkeye.  I will put her on ofloxacin.  She does not wear contacts.  There is no signs of glaucoma, preseptal cellulitis, orbital cellulitis.  A known side effect of Dupixent is conjunctivitis.  I am referring her to ophthalmology for further evaluation.  Stable for discharge. Follow-up with PCP. Return to urgent care or go to the emergency department if symptoms  worsen or if new symptoms develop.    Orders and Diagnoses  Diagnoses and all orders for this visit:  Acute conjunctivitis of left eye, unspecified acute conjunctivitis type  -     ofloxacin (Ocuflox) 0.3 % ophthalmic solution; Administer 1 drop into the left eye 4 times a day for 7 days.  -     Referral to Ophthalmology; Future  Acute non-recurrent sinusitis, unspecified location  -     amoxicillin-clavulanate (Augmentin) 875-125 mg tablet; Take 1 tablet (875 mg of amoxicillin) by mouth 2 times a day.      Medical Admin Record      Patient disposition: Home    Electronically signed by Palomo Alexander PA-C  8:35 AM           [1] (Not in a hospital admission)  [2]   Past Medical History:  Diagnosis Date    Personal history of other diseases of the respiratory system     History of asthma   [3]   Past Surgical History:  Procedure Laterality Date    OTHER SURGICAL HISTORY  10/19/2021    Oral surgery

## 2025-08-07 ENCOUNTER — APPOINTMENT (OUTPATIENT)
Dept: OPHTHALMOLOGY | Facility: CLINIC | Age: 40
End: 2025-08-07
Payer: COMMERCIAL